# Patient Record
Sex: FEMALE | Race: WHITE | NOT HISPANIC OR LATINO | URBAN - METROPOLITAN AREA
[De-identification: names, ages, dates, MRNs, and addresses within clinical notes are randomized per-mention and may not be internally consistent; named-entity substitution may affect disease eponyms.]

---

## 2018-08-26 ENCOUNTER — EMERGENCY (EMERGENCY)
Facility: HOSPITAL | Age: 68
LOS: 1 days | Discharge: HOME | End: 2018-08-26
Admitting: EMERGENCY MEDICINE
Payer: MEDICARE

## 2018-08-26 VITALS
TEMPERATURE: 98 F | OXYGEN SATURATION: 98 % | WEIGHT: 104.94 LBS | DIASTOLIC BLOOD PRESSURE: 65 MMHG | HEART RATE: 75 BPM | RESPIRATION RATE: 18 BRPM | SYSTOLIC BLOOD PRESSURE: 128 MMHG

## 2018-08-26 DIAGNOSIS — S92.514A NONDISPLACED FRACTURE OF PROXIMAL PHALANX OF RIGHT LESSER TOE(S), INITIAL ENCOUNTER FOR CLOSED FRACTURE: ICD-10-CM

## 2018-08-26 DIAGNOSIS — Z88.0 ALLERGY STATUS TO PENICILLIN: ICD-10-CM
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
## 2018-08-26 DIAGNOSIS — M79.671 PAIN IN RIGHT FOOT: ICD-10-CM
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
## 2018-08-26 PROCEDURE — 99284 EMERGENCY DEPT VISIT MOD MDM: CPT

## 2018-08-26 PROCEDURE — 73660 X-RAY EXAM OF TOE(S): CPT | Mod: 26,RT

## 2018-08-26 RX ORDER — IBUPROFEN 200 MG
400 TABLET ORAL ONCE
Qty: 0 | Refills: 0 | Status: COMPLETED | OUTPATIENT
Start: 2018-08-26 | End: 2018-08-26

## 2018-08-26 RX ADMIN — Medication 400 MILLIGRAM(S): at 11:01

## 2018-08-26 NOTE — ED ADULT NURSE NOTE - NSIMPLEMENTINTERV_GEN_ALL_ED
Implemented All Universal Safety Interventions:  [place] to call system. Call bell, personal items and telephone within reach. Instruct patient to call for assistance. Room bathroom lighting operational. Non-slip footwear when patient is off stretcher. Physically safe environment: no spills, clutter or unnecessary equipment. Stretcher in lowest position, wheels locked, appropriate side rails in place.

## 2018-08-26 NOTE — ED PROVIDER NOTE - PHYSICAL EXAMINATION
R 4th digit: ecchymosis, edema, tenderness. no deformity. <2 sec capillary refill. sensation intact. minimal ROM    VITAL SIGNS: I have reviewed nursing notes and confirm.  CONSTITUTIONAL: Well-developed; well-nourished; in no acute distress.  SKIN: Skin is warm and dry, no acute rash.  HEAD: Normocephalic; atraumatic.  EYES: PERRL, EOM intact; conjunctiva and sclera clear.  ENT: No nasal discharge; airway clear.  NECK: Supple; non tender.  CARD: S1, S2 normal; no murmurs, gallops, or rubs. Regular rate and rhythm.  RESP: No wheezes, rales or rhonchi.  ABD: Normal bowel sounds; soft; non-distended; non-tender; no hepatosplenomegaly.  EXT: Normal ROM. No clubbing, cyanosis or edema.  NEURO: Alert, oriented. Grossly unremarkable.  PSYCH: Cooperative, appropriate.

## 2018-09-17 ENCOUNTER — OUTPATIENT (OUTPATIENT)
Dept: OUTPATIENT SERVICES | Facility: HOSPITAL | Age: 68
LOS: 1 days | End: 2018-09-17

## 2018-09-17 ENCOUNTER — APPOINTMENT (OUTPATIENT)
Dept: RADIOLOGY | Facility: CLINIC | Age: 68
End: 2018-09-17
Payer: MEDICARE

## 2018-09-17 PROBLEM — Z00.00 ENCOUNTER FOR PREVENTIVE HEALTH EXAMINATION: Status: ACTIVE | Noted: 2018-09-17

## 2018-09-17 PROCEDURE — 73660 X-RAY EXAM OF TOE(S): CPT | Mod: 26,RT

## 2018-09-20 ENCOUNTER — TRANSCRIPTION ENCOUNTER (OUTPATIENT)
Age: 68
End: 2018-09-20

## 2019-01-21 ENCOUNTER — EMERGENCY (EMERGENCY)
Facility: HOSPITAL | Age: 69
LOS: 1 days | Discharge: HOME | End: 2019-01-21
Admitting: EMERGENCY MEDICINE
Payer: MEDICARE

## 2019-01-21 VITALS
WEIGHT: 104.94 LBS | OXYGEN SATURATION: 98 % | RESPIRATION RATE: 16 BRPM | HEART RATE: 81 BPM | TEMPERATURE: 98 F | DIASTOLIC BLOOD PRESSURE: 61 MMHG | SYSTOLIC BLOOD PRESSURE: 134 MMHG

## 2019-01-21 PROCEDURE — 99283 EMERGENCY DEPT VISIT LOW MDM: CPT

## 2019-01-21 NOTE — ED PROVIDER NOTE - OBJECTIVE STATEMENT
67 y/o F presents to the ED with 3 weeks of on and off flu-like sx's. She endorses chills, nasal congestion, dry to productive cough and not feeling well. She states she was coughing quite a bit last night so she took OTC cough medicine which improved her cough, however she would like to make sure she is ok. She has a PMD but states she has not had time to call to make the appointment. No recent international travel.    Denies fever, headache, earache, epistaxis, sore throat, hemoptysis, CP, SOB, palpitations, abdo pain, N/V/D, rash, or recent international travel

## 2019-01-21 NOTE — ED PROVIDER NOTE - MEDICAL DECISION MAKING DETAILS
69 y/o F p/w 3 weeks of on and off flu-like sx's. She is A&Ox3, NAD and sitting comfortably. AFVSS. Will D/C with supportive tx instructions and to F/U with her PMD in one week. Strict return precautions reviewed with pt in which pt verbalizes understanding and agrees to.

## 2019-01-21 NOTE — ED PROVIDER NOTE - NSFOLLOWUPINSTRUCTIONS_ED_ALL_ED_FT
DRINK PLENTY OF FLUIDS TO STAY HYDRATED. TAKE TYLENOL AND/OR IBUPROFEN AS NEEDED.    FOLLOW UP WITH YOUR PRIMARY CARE PHYSICIAN IN ONE WEEK.    RETURN TO THE ER IF YOU DEVELOP PERSISTENT HIGH FEVER, CHEST PAIN, DIFFICULTY BREATHING, COUGHING UP BLOOD, OR ANY OTHER CONCERNING SIGNS OR SYMPTOMS.

## 2019-01-21 NOTE — ED ADULT NURSE NOTE - OBJECTIVE STATEMENT
pt is a 69 y/o female presents to the ED for URI sx x 3 weeks. denies fevers/chills. pt in NAD, resting comfortably and will continue to monitor.

## 2019-01-25 DIAGNOSIS — R68.83 CHILLS (WITHOUT FEVER): ICD-10-CM

## 2019-01-25 DIAGNOSIS — Z88.0 ALLERGY STATUS TO PENICILLIN: ICD-10-CM

## 2019-01-25 DIAGNOSIS — B34.9 VIRAL INFECTION, UNSPECIFIED: ICD-10-CM

## 2019-12-05 ENCOUNTER — TRANSCRIPTION ENCOUNTER (OUTPATIENT)
Age: 69
End: 2019-12-05

## 2019-12-17 ENCOUNTER — EMERGENCY (EMERGENCY)
Facility: HOSPITAL | Age: 69
LOS: 1 days | Discharge: HOME | End: 2019-12-17
Attending: EMERGENCY MEDICINE | Admitting: EMERGENCY MEDICINE
Payer: MEDICARE

## 2019-12-17 VITALS
OXYGEN SATURATION: 100 % | RESPIRATION RATE: 16 BRPM | DIASTOLIC BLOOD PRESSURE: 67 MMHG | TEMPERATURE: 98 F | HEART RATE: 64 BPM | SYSTOLIC BLOOD PRESSURE: 123 MMHG

## 2019-12-17 VITALS
HEART RATE: 72 BPM | OXYGEN SATURATION: 98 % | TEMPERATURE: 98 F | RESPIRATION RATE: 16 BRPM | DIASTOLIC BLOOD PRESSURE: 65 MMHG | SYSTOLIC BLOOD PRESSURE: 133 MMHG | WEIGHT: 104.94 LBS | HEIGHT: 65 IN

## 2019-12-17 LAB
ALBUMIN SERPL ELPH-MCNC: 4.2 G/DL — SIGNIFICANT CHANGE UP (ref 3.4–5)
ALP SERPL-CCNC: 52 U/L — SIGNIFICANT CHANGE UP (ref 40–120)
ALT FLD-CCNC: 29 U/L — SIGNIFICANT CHANGE UP (ref 12–42)
ANION GAP SERPL CALC-SCNC: 9 MMOL/L — SIGNIFICANT CHANGE UP (ref 9–16)
APPEARANCE UR: CLEAR — SIGNIFICANT CHANGE UP
AST SERPL-CCNC: 31 U/L — SIGNIFICANT CHANGE UP (ref 15–37)
BASOPHILS # BLD AUTO: 0.06 K/UL — SIGNIFICANT CHANGE UP (ref 0–0.2)
BASOPHILS NFR BLD AUTO: 0.7 % — SIGNIFICANT CHANGE UP (ref 0–2)
BILIRUB SERPL-MCNC: 0.4 MG/DL — SIGNIFICANT CHANGE UP (ref 0.2–1.2)
BILIRUB UR-MCNC: NEGATIVE — SIGNIFICANT CHANGE UP
BUN SERPL-MCNC: 28 MG/DL — HIGH (ref 7–23)
CALCIUM SERPL-MCNC: 10 MG/DL — SIGNIFICANT CHANGE UP (ref 8.5–10.5)
CHLORIDE SERPL-SCNC: 104 MMOL/L — SIGNIFICANT CHANGE UP (ref 96–108)
CO2 SERPL-SCNC: 27 MMOL/L — SIGNIFICANT CHANGE UP (ref 22–31)
COLOR SPEC: YELLOW — SIGNIFICANT CHANGE UP
CREAT SERPL-MCNC: 0.87 MG/DL — SIGNIFICANT CHANGE UP (ref 0.5–1.3)
DIFF PNL FLD: NEGATIVE — SIGNIFICANT CHANGE UP
EOSINOPHIL # BLD AUTO: 0.02 K/UL — SIGNIFICANT CHANGE UP (ref 0–0.5)
EOSINOPHIL NFR BLD AUTO: 0.2 % — SIGNIFICANT CHANGE UP (ref 0–6)
GLUCOSE SERPL-MCNC: 92 MG/DL — SIGNIFICANT CHANGE UP (ref 70–99)
GLUCOSE UR QL: NEGATIVE — SIGNIFICANT CHANGE UP
HCT VFR BLD CALC: 37.1 % — SIGNIFICANT CHANGE UP (ref 34.5–45)
HGB BLD-MCNC: 12.7 G/DL — SIGNIFICANT CHANGE UP (ref 11.5–15.5)
IMM GRANULOCYTES NFR BLD AUTO: 0.2 % — SIGNIFICANT CHANGE UP (ref 0–1.5)
KETONES UR-MCNC: NEGATIVE — SIGNIFICANT CHANGE UP
LEUKOCYTE ESTERASE UR-ACNC: NEGATIVE — SIGNIFICANT CHANGE UP
LYMPHOCYTES # BLD AUTO: 1.66 K/UL — SIGNIFICANT CHANGE UP (ref 1–3.3)
LYMPHOCYTES # BLD AUTO: 18.7 % — SIGNIFICANT CHANGE UP (ref 13–44)
MAGNESIUM SERPL-MCNC: 1.9 MG/DL — SIGNIFICANT CHANGE UP (ref 1.6–2.6)
MCHC RBC-ENTMCNC: 32.8 PG — SIGNIFICANT CHANGE UP (ref 27–34)
MCHC RBC-ENTMCNC: 34.2 GM/DL — SIGNIFICANT CHANGE UP (ref 32–36)
MCV RBC AUTO: 95.9 FL — SIGNIFICANT CHANGE UP (ref 80–100)
MONOCYTES # BLD AUTO: 0.61 K/UL — SIGNIFICANT CHANGE UP (ref 0–0.9)
MONOCYTES NFR BLD AUTO: 6.9 % — SIGNIFICANT CHANGE UP (ref 2–14)
NEUTROPHILS # BLD AUTO: 6.52 K/UL — SIGNIFICANT CHANGE UP (ref 1.8–7.4)
NEUTROPHILS NFR BLD AUTO: 73.3 % — SIGNIFICANT CHANGE UP (ref 43–77)
NITRITE UR-MCNC: NEGATIVE — SIGNIFICANT CHANGE UP
NRBC # BLD: 0 /100 WBCS — SIGNIFICANT CHANGE UP (ref 0–0)
PH UR: 6 — SIGNIFICANT CHANGE UP (ref 5–8)
PLATELET # BLD AUTO: 327 K/UL — SIGNIFICANT CHANGE UP (ref 150–400)
POTASSIUM SERPL-MCNC: 4.7 MMOL/L — SIGNIFICANT CHANGE UP (ref 3.5–5.3)
POTASSIUM SERPL-SCNC: 4.7 MMOL/L — SIGNIFICANT CHANGE UP (ref 3.5–5.3)
PROT SERPL-MCNC: 7.8 G/DL — SIGNIFICANT CHANGE UP (ref 6.4–8.2)
PROT UR-MCNC: NEGATIVE MG/DL — SIGNIFICANT CHANGE UP
RBC # BLD: 3.87 M/UL — SIGNIFICANT CHANGE UP (ref 3.8–5.2)
RBC # FLD: 12.5 % — SIGNIFICANT CHANGE UP (ref 10.3–14.5)
SODIUM SERPL-SCNC: 140 MMOL/L — SIGNIFICANT CHANGE UP (ref 132–145)
SP GR SPEC: <=1.005 — SIGNIFICANT CHANGE UP (ref 1–1.03)
TROPONIN I SERPL-MCNC: <0.017 NG/ML — LOW (ref 0.02–0.06)
TSH SERPL-MCNC: 2.17 UIU/ML — SIGNIFICANT CHANGE UP (ref 0.36–3.74)
UROBILINOGEN FLD QL: 0.2 E.U./DL — SIGNIFICANT CHANGE UP
WBC # BLD: 8.89 K/UL — SIGNIFICANT CHANGE UP (ref 3.8–10.5)
WBC # FLD AUTO: 8.89 K/UL — SIGNIFICANT CHANGE UP (ref 3.8–10.5)

## 2019-12-17 PROCEDURE — 99284 EMERGENCY DEPT VISIT MOD MDM: CPT

## 2019-12-17 PROCEDURE — 93010 ELECTROCARDIOGRAM REPORT: CPT

## 2019-12-17 PROCEDURE — 70450 CT HEAD/BRAIN W/O DYE: CPT | Mod: 26

## 2019-12-17 RX ORDER — MECLIZINE HCL 12.5 MG
1 TABLET ORAL
Qty: 20 | Refills: 0
Start: 2019-12-17 | End: 2019-12-21

## 2019-12-17 NOTE — ED PROVIDER NOTE - PROGRESS NOTE DETAILS
no dizziness now, normal labs, UA and CT head. Encouraged f/u w ent, trial of meclizine, RTER if any worsening symptoms

## 2019-12-17 NOTE — ED PROVIDER NOTE - PATIENT PORTAL LINK FT
You can access the FollowMyHealth Patient Portal offered by [facility] by registering at the following website: [email or web address]. By joining [employer]’s FollowMyHealth portal, you will also be able to view your health information using other applications (apps) compatible with our system.

## 2019-12-17 NOTE — ED PROVIDER NOTE - CARE PROVIDER_API CALL
Chai Aragon)  Otolaryngology  [address], [address]nd [place] [address]  Phone: [phone]  Fax: [phone]  Follow Up Time:

## 2019-12-17 NOTE — ED ADULT TRIAGE NOTE - CHIEF COMPLAINT QUOTE
reoccurring "imbalance" x 2 years, this episode started 2 weeks ago, denies dizziness, weakness, pain

## 2019-12-17 NOTE — ED PROVIDER NOTE - OBJECTIVE STATEMENT
68 yo female pt, 68 yo female pt, no hx of med problems. Hx of prior episode of dizziness (2 years ag0 - worked up by ENT [facility] and had MRIs, hearing test, self limited episode). Presents today with intermitent episodes of dizziness, feels off balance and times, briefly, then resolves for 1-2 weeks. States she has had some recent stressors, mom recently . Sister was sick with a laryngitis and she was exposed. today woke up with a sorethroat. maybe some mild URI symptoms recently. ROS: no cough, no chest pain, no palpitations, no HA, no neck pain, no ear pain, no abd pain, no weakness, no paresthesias, no aphasia.

## 2019-12-17 NOTE — ED PROVIDER NOTE - CLINICAL SUMMARY MEDICAL DECISION MAKING FREE TEXT BOX
Pt w dizziness, intermitent for 2 weeks, explained that since symptoms are insidious will do work up including labs and HCT, maybe reactivation of prior peripheral dizziness?

## 2019-12-22 DIAGNOSIS — R42 DIZZINESS AND GIDDINESS: ICD-10-CM

## 2022-01-28 ENCOUNTER — ANESTHESIA EVENT (INPATIENT)
Dept: OPERATING ROOM | Facility: HOSPITAL | Age: 72
DRG: 481 | End: 2022-01-28
Payer: MEDICARE

## 2022-01-28 ENCOUNTER — ANESTHESIA (INPATIENT)
Dept: OPERATING ROOM | Facility: HOSPITAL | Age: 72
DRG: 481 | End: 2022-01-28
Payer: MEDICARE

## 2022-01-28 ENCOUNTER — APPOINTMENT (EMERGENCY)
Dept: RADIOLOGY | Facility: HOSPITAL | Age: 72
DRG: 481 | End: 2022-01-28
Attending: EMERGENCY MEDICINE
Payer: MEDICARE

## 2022-01-28 ENCOUNTER — HOSPITAL ENCOUNTER (INPATIENT)
Facility: HOSPITAL | Age: 72
LOS: 3 days | Discharge: HOME | DRG: 481 | End: 2022-01-31
Attending: EMERGENCY MEDICINE | Admitting: ORTHOPAEDIC SURGERY
Payer: MEDICARE

## 2022-01-28 ENCOUNTER — APPOINTMENT (EMERGENCY)
Dept: RADIOLOGY | Facility: HOSPITAL | Age: 72
DRG: 481 | End: 2022-01-28
Attending: STUDENT IN AN ORGANIZED HEALTH CARE EDUCATION/TRAINING PROGRAM
Payer: MEDICARE

## 2022-01-28 ENCOUNTER — APPOINTMENT (INPATIENT)
Dept: RADIOLOGY | Facility: HOSPITAL | Age: 72
DRG: 481 | End: 2022-01-28
Attending: ORTHOPAEDIC SURGERY
Payer: MEDICARE

## 2022-01-28 DIAGNOSIS — N83.201 BILATERAL OVARIAN CYSTS: ICD-10-CM

## 2022-01-28 DIAGNOSIS — N83.202 BILATERAL OVARIAN CYSTS: ICD-10-CM

## 2022-01-28 DIAGNOSIS — S72.002A CLOSED LEFT HIP FRACTURE, INITIAL ENCOUNTER (CMS/HCC): Primary | ICD-10-CM

## 2022-01-28 PROBLEM — L70.8 OTHER ACNE: Status: ACTIVE | Noted: 2022-01-28

## 2022-01-28 PROBLEM — Z01.818 ENCOUNTER FOR PRE-OPERATIVE EXAMINATION: Status: ACTIVE | Noted: 2022-01-28

## 2022-01-28 PROBLEM — R09.89 BILATERAL CAROTID BRUITS: Status: ACTIVE | Noted: 2022-01-28

## 2022-01-28 PROBLEM — D72.829 LEUCOCYTOSIS: Status: ACTIVE | Noted: 2022-01-28

## 2022-01-28 LAB
ABO + RH BLD: NORMAL
ANION GAP SERPL CALC-SCNC: 13 MEQ/L (ref 3–15)
APTT PPP: 24 SEC (ref 23–35)
BASOPHILS # BLD: 0.04 K/UL (ref 0.01–0.1)
BASOPHILS NFR BLD: 0.3 %
BLD GP AB SCN SERPL QL: NEGATIVE
BUN SERPL-MCNC: 23 MG/DL (ref 8–20)
CALCIUM SERPL-MCNC: 10.1 MG/DL (ref 8.9–10.3)
CHLORIDE SERPL-SCNC: 99 MEQ/L (ref 98–109)
CO2 SERPL-SCNC: 24 MEQ/L (ref 22–32)
CREAT SERPL-MCNC: 0.9 MG/DL (ref 0.6–1.1)
D AG BLD QL: POSITIVE
DIFFERENTIAL METHOD BLD: ABNORMAL
EOSINOPHIL # BLD: 0 K/UL (ref 0.04–0.36)
EOSINOPHIL NFR BLD: 0 %
ERYTHROCYTE [DISTWIDTH] IN BLOOD BY AUTOMATED COUNT: 11.9 % (ref 11.7–14.4)
GFR SERPL CREATININE-BSD FRML MDRD: >60 ML/MIN/1.73M*2
GLUCOSE SERPL-MCNC: 95 MG/DL (ref 70–99)
HCT VFR BLDCO AUTO: 37.8 % (ref 35–45)
HGB BLD-MCNC: 12.7 G/DL (ref 11.8–15.7)
IMM GRANULOCYTES # BLD AUTO: 0.06 K/UL (ref 0–0.08)
IMM GRANULOCYTES NFR BLD AUTO: 0.5 %
INR PPP: 1.1
LABORATORY COMMENT REPORT: NORMAL
LYMPHOCYTES # BLD: 0.75 K/UL (ref 1.2–3.5)
LYMPHOCYTES NFR BLD: 6 %
MCH RBC QN AUTO: 33.2 PG (ref 28–33.2)
MCHC RBC AUTO-ENTMCNC: 33.6 G/DL (ref 32.2–35.5)
MCV RBC AUTO: 99 FL (ref 83–98)
MONOCYTES # BLD: 0.6 K/UL (ref 0.28–0.8)
MONOCYTES NFR BLD: 4.8 %
NEUTROPHILS # BLD: 11.07 K/UL (ref 1.7–7)
NEUTS SEG NFR BLD: 88.4 %
NRBC BLD-RTO: 0 %
PDW BLD AUTO: 9.7 FL (ref 9.4–12.3)
PLATELET # BLD AUTO: 302 K/UL (ref 150–369)
POTASSIUM SERPL-SCNC: 4.2 MEQ/L (ref 3.6–5.1)
PROTHROMBIN TIME: 14.2 SEC (ref 12.2–14.5)
RBC # BLD AUTO: 3.82 M/UL (ref 3.93–5.22)
SARS-COV-2 RNA RESP QL NAA+PROBE: NEGATIVE
SODIUM SERPL-SCNC: 136 MEQ/L (ref 136–144)
SPECIMEN EXP DATE BLD: NORMAL
WBC # BLD AUTO: 12.52 K/UL (ref 3.8–10.5)

## 2022-01-28 PROCEDURE — 36000004 HC OR LEVEL 4 INITIAL 30MIN: Performed by: ORTHOPAEDIC SURGERY

## 2022-01-28 PROCEDURE — 36415 COLL VENOUS BLD VENIPUNCTURE: CPT | Performed by: PHYSICIAN ASSISTANT

## 2022-01-28 PROCEDURE — 36000014 HC OR LEVEL 4 EA ADDL MIN: Performed by: ORTHOPAEDIC SURGERY

## 2022-01-28 PROCEDURE — 71000001 HC PACU PHASE 1 INITIAL 30MIN: Performed by: ORTHOPAEDIC SURGERY

## 2022-01-28 PROCEDURE — 63600000 HC DRUGS/DETAIL CODE: Performed by: ANESTHESIOLOGY

## 2022-01-28 PROCEDURE — U0002 COVID-19 LAB TEST NON-CDC: HCPCS | Performed by: PHYSICIAN ASSISTANT

## 2022-01-28 PROCEDURE — 99233 SBSQ HOSP IP/OBS HIGH 50: CPT | Performed by: HOSPITALIST

## 2022-01-28 PROCEDURE — 0QS736Z REPOSITION LEFT UPPER FEMUR WITH INTRAMEDULLARY INTERNAL FIXATION DEVICE, PERCUTANEOUS APPROACH: ICD-10-PCS | Performed by: ORTHOPAEDIC SURGERY

## 2022-01-28 PROCEDURE — 63600000 HC DRUGS/DETAIL CODE: Performed by: STUDENT IN AN ORGANIZED HEALTH CARE EDUCATION/TRAINING PROGRAM

## 2022-01-28 PROCEDURE — G1004 CDSM NDSC: HCPCS

## 2022-01-28 PROCEDURE — 63600000 HC DRUGS/DETAIL CODE: Performed by: NURSE ANESTHETIST, CERTIFIED REGISTERED

## 2022-01-28 PROCEDURE — 25800000 HC PHARMACY IV SOLUTIONS: Performed by: NURSE ANESTHETIST, CERTIFIED REGISTERED

## 2022-01-28 PROCEDURE — 85610 PROTHROMBIN TIME: CPT | Performed by: PHYSICIAN ASSISTANT

## 2022-01-28 PROCEDURE — 71045 X-RAY EXAM CHEST 1 VIEW: CPT

## 2022-01-28 PROCEDURE — 86900 BLOOD TYPING SEROLOGIC ABO: CPT

## 2022-01-28 PROCEDURE — 63700000 HC SELF-ADMINISTRABLE DRUG: Performed by: STUDENT IN AN ORGANIZED HEALTH CARE EDUCATION/TRAINING PROGRAM

## 2022-01-28 PROCEDURE — 80048 BASIC METABOLIC PNL TOTAL CA: CPT | Performed by: PHYSICIAN ASSISTANT

## 2022-01-28 PROCEDURE — 37000001 HC ANESTHESIA GENERAL: Performed by: ORTHOPAEDIC SURGERY

## 2022-01-28 PROCEDURE — 71000011 HC PACU PHASE 1 EA ADDL MIN: Performed by: ORTHOPAEDIC SURGERY

## 2022-01-28 PROCEDURE — 85025 COMPLETE CBC W/AUTO DIFF WBC: CPT | Performed by: PHYSICIAN ASSISTANT

## 2022-01-28 PROCEDURE — 73502 X-RAY EXAM HIP UNI 2-3 VIEWS: CPT | Mod: LT

## 2022-01-28 PROCEDURE — 25000000 HC PHARMACY GENERAL: Performed by: NURSE ANESTHETIST, CERTIFIED REGISTERED

## 2022-01-28 PROCEDURE — 93005 ELECTROCARDIOGRAM TRACING: CPT | Performed by: PHYSICIAN ASSISTANT

## 2022-01-28 PROCEDURE — 36100330 FL FLUOROSCOPY TECHNICAL ASSISTANCE

## 2022-01-28 PROCEDURE — 12000000 HC ROOM AND CARE MED/SURG

## 2022-01-28 PROCEDURE — 85730 THROMBOPLASTIN TIME PARTIAL: CPT | Performed by: PHYSICIAN ASSISTANT

## 2022-01-28 PROCEDURE — 99285 EMERGENCY DEPT VISIT HI MDM: CPT | Mod: 25

## 2022-01-28 PROCEDURE — 63600000 HC DRUGS/DETAIL CODE: Performed by: PHYSICIAN ASSISTANT

## 2022-01-28 PROCEDURE — C1713 ANCHOR/SCREW BN/BN,TIS/BN: HCPCS | Performed by: ORTHOPAEDIC SURGERY

## 2022-01-28 PROCEDURE — 27200000 HC STERILE SUPPLY: Performed by: ORTHOPAEDIC SURGERY

## 2022-01-28 DEVICE — SCREW TFNA 80MM - STERILE: Type: IMPLANTABLE DEVICE | Site: HIP | Status: FUNCTIONAL

## 2022-01-28 DEVICE — NAIL TFNA 10MM/130 DEG TI CANN 170MM - STERILE: Type: IMPLANTABLE DEVICE | Site: HIP | Status: FUNCTIONAL

## 2022-01-28 DEVICE — SCREW 5.0MM TI LOCKING  W/T25 STARDRIVE 30MM F/IM NAIL STER: Type: IMPLANTABLE DEVICE | Site: HIP | Status: FUNCTIONAL

## 2022-01-28 RX ORDER — IBUPROFEN 200 MG
16-32 TABLET ORAL AS NEEDED
Status: DISCONTINUED | OUTPATIENT
Start: 2022-01-28 | End: 2022-01-31

## 2022-01-28 RX ORDER — IBUPROFEN 200 MG
16-32 TABLET ORAL AS NEEDED
Status: DISCONTINUED | OUTPATIENT
Start: 2022-01-28 | End: 2022-01-28

## 2022-01-28 RX ORDER — MORPHINE SULFATE 2 MG/ML
2 INJECTION, SOLUTION INTRAMUSCULAR; INTRAVENOUS ONCE
Status: COMPLETED | OUTPATIENT
Start: 2022-01-28 | End: 2022-01-28

## 2022-01-28 RX ORDER — POLYETHYLENE GLYCOL 3350 17 G/17G
17 POWDER, FOR SOLUTION ORAL DAILY
Status: DISCONTINUED | OUTPATIENT
Start: 2022-01-29 | End: 2022-01-31 | Stop reason: HOSPADM

## 2022-01-28 RX ORDER — DEXTROSE 50 % IN WATER (D50W) INTRAVENOUS SYRINGE
25 AS NEEDED
Status: DISCONTINUED | OUTPATIENT
Start: 2022-01-28 | End: 2022-01-31

## 2022-01-28 RX ORDER — DEXTROSE 40 %
15-30 GEL (GRAM) ORAL AS NEEDED
Status: DISCONTINUED | OUTPATIENT
Start: 2022-01-28 | End: 2022-01-31

## 2022-01-28 RX ORDER — OXYCODONE HYDROCHLORIDE 5 MG/1
5 TABLET ORAL ONCE
Status: COMPLETED | OUTPATIENT
Start: 2022-01-28 | End: 2022-01-28

## 2022-01-28 RX ORDER — DEXTROSE 50 % IN WATER (D50W) INTRAVENOUS SYRINGE
25 AS NEEDED
Status: DISCONTINUED | OUTPATIENT
Start: 2022-01-28 | End: 2022-01-28

## 2022-01-28 RX ORDER — SODIUM CHLORIDE 9 MG/ML
INJECTION, SOLUTION INTRAVENOUS CONTINUOUS PRN
Status: DISCONTINUED | OUTPATIENT
Start: 2022-01-28 | End: 2022-01-28 | Stop reason: SURG

## 2022-01-28 RX ORDER — DEXTROSE 40 %
15-30 GEL (GRAM) ORAL AS NEEDED
Status: DISCONTINUED | OUTPATIENT
Start: 2022-01-28 | End: 2022-01-28

## 2022-01-28 RX ORDER — FENTANYL CITRATE 50 UG/ML
INJECTION, SOLUTION INTRAMUSCULAR; INTRAVENOUS AS NEEDED
Status: DISCONTINUED | OUTPATIENT
Start: 2022-01-28 | End: 2022-01-28 | Stop reason: SURG

## 2022-01-28 RX ORDER — DEXAMETHASONE SODIUM PHOSPHATE 4 MG/ML
INJECTION, SOLUTION INTRA-ARTICULAR; INTRALESIONAL; INTRAMUSCULAR; INTRAVENOUS; SOFT TISSUE AS NEEDED
Status: DISCONTINUED | OUTPATIENT
Start: 2022-01-28 | End: 2022-01-28 | Stop reason: SURG

## 2022-01-28 RX ORDER — NAPROXEN SODIUM 220 MG/1
81 TABLET, FILM COATED ORAL 2 TIMES DAILY
Status: DISCONTINUED | OUTPATIENT
Start: 2022-01-28 | End: 2022-01-31 | Stop reason: HOSPADM

## 2022-01-28 RX ORDER — ONDANSETRON HYDROCHLORIDE 2 MG/ML
INJECTION, SOLUTION INTRAVENOUS AS NEEDED
Status: DISCONTINUED | OUTPATIENT
Start: 2022-01-28 | End: 2022-01-28 | Stop reason: SURG

## 2022-01-28 RX ORDER — ROCURONIUM BROMIDE 10 MG/ML
INJECTION, SOLUTION INTRAVENOUS AS NEEDED
Status: DISCONTINUED | OUTPATIENT
Start: 2022-01-28 | End: 2022-01-28 | Stop reason: SURG

## 2022-01-28 RX ORDER — PROPOFOL 10 MG/ML
INJECTION, EMULSION INTRAVENOUS AS NEEDED
Status: DISCONTINUED | OUTPATIENT
Start: 2022-01-28 | End: 2022-01-28 | Stop reason: SURG

## 2022-01-28 RX ORDER — AMOXICILLIN 250 MG
1 CAPSULE ORAL 2 TIMES DAILY
Status: DISCONTINUED | OUTPATIENT
Start: 2022-01-28 | End: 2022-01-28

## 2022-01-28 RX ORDER — HYDROMORPHONE HYDROCHLORIDE 1 MG/ML
0.5 INJECTION, SOLUTION INTRAMUSCULAR; INTRAVENOUS; SUBCUTANEOUS
Status: DISCONTINUED | OUTPATIENT
Start: 2022-01-28 | End: 2022-01-28 | Stop reason: HOSPADM

## 2022-01-28 RX ORDER — IBUPROFEN 200 MG
16-32 TABLET ORAL AS NEEDED
Status: DISCONTINUED | OUTPATIENT
Start: 2022-01-28 | End: 2022-01-31 | Stop reason: HOSPADM

## 2022-01-28 RX ORDER — FENTANYL CITRATE 50 UG/ML
50 INJECTION, SOLUTION INTRAMUSCULAR; INTRAVENOUS
Status: DISCONTINUED | OUTPATIENT
Start: 2022-01-28 | End: 2022-01-28 | Stop reason: HOSPADM

## 2022-01-28 RX ORDER — HYDROMORPHONE HYDROCHLORIDE 1 MG/ML
0.5 INJECTION, SOLUTION INTRAMUSCULAR; INTRAVENOUS; SUBCUTANEOUS ONCE
Status: COMPLETED | OUTPATIENT
Start: 2022-01-28 | End: 2022-01-28

## 2022-01-28 RX ORDER — DEXTROSE 50 % IN WATER (D50W) INTRAVENOUS SYRINGE
25 AS NEEDED
Status: DISCONTINUED | OUTPATIENT
Start: 2022-01-28 | End: 2022-01-31 | Stop reason: HOSPADM

## 2022-01-28 RX ORDER — AMOXICILLIN 250 MG
1 CAPSULE ORAL 2 TIMES DAILY
Status: DISCONTINUED | OUTPATIENT
Start: 2022-01-28 | End: 2022-01-31 | Stop reason: HOSPADM

## 2022-01-28 RX ORDER — ONDANSETRON HYDROCHLORIDE 2 MG/ML
4 INJECTION, SOLUTION INTRAVENOUS
Status: DISCONTINUED | OUTPATIENT
Start: 2022-01-28 | End: 2022-01-28 | Stop reason: HOSPADM

## 2022-01-28 RX ORDER — SPIRONOLACTONE 100 MG/1
100 TABLET, FILM COATED ORAL DAILY
COMMUNITY
Start: 2021-12-21

## 2022-01-28 RX ORDER — ACETAMINOPHEN 325 MG/1
650 TABLET ORAL EVERY 4 HOURS
Status: DISCONTINUED | OUTPATIENT
Start: 2022-01-28 | End: 2022-01-31 | Stop reason: HOSPADM

## 2022-01-28 RX ORDER — MIDAZOLAM HYDROCHLORIDE 2 MG/2ML
INJECTION, SOLUTION INTRAMUSCULAR; INTRAVENOUS AS NEEDED
Status: DISCONTINUED | OUTPATIENT
Start: 2022-01-28 | End: 2022-01-28 | Stop reason: SURG

## 2022-01-28 RX ORDER — LIDOCAINE HYDROCHLORIDE 10 MG/ML
INJECTION, SOLUTION INFILTRATION; PERINEURAL AS NEEDED
Status: DISCONTINUED | OUTPATIENT
Start: 2022-01-28 | End: 2022-01-28 | Stop reason: SURG

## 2022-01-28 RX ORDER — OXYCODONE HYDROCHLORIDE 5 MG/1
5-10 TABLET ORAL EVERY 4 HOURS PRN
Status: DISCONTINUED | OUTPATIENT
Start: 2022-01-28 | End: 2022-01-31 | Stop reason: HOSPADM

## 2022-01-28 RX ORDER — PHENYLEPHRINE HYDROCHLORIDE 10 MG/ML
INJECTION INTRAVENOUS AS NEEDED
Status: DISCONTINUED | OUTPATIENT
Start: 2022-01-28 | End: 2022-01-28 | Stop reason: SURG

## 2022-01-28 RX ORDER — CEFAZOLIN SODIUM 2 G/100ML
INJECTION, SOLUTION INTRAVENOUS AS NEEDED
Status: DISCONTINUED | OUTPATIENT
Start: 2022-01-28 | End: 2022-01-28 | Stop reason: SURG

## 2022-01-28 RX ORDER — DEXTROSE 40 %
15-30 GEL (GRAM) ORAL AS NEEDED
Status: DISCONTINUED | OUTPATIENT
Start: 2022-01-28 | End: 2022-01-31 | Stop reason: HOSPADM

## 2022-01-28 RX ORDER — ACETAMINOPHEN 500 MG
5000 TABLET ORAL DAILY
COMMUNITY

## 2022-01-28 RX ORDER — ALUMINUM HYDROXIDE, MAGNESIUM HYDROXIDE, AND SIMETHICONE 1200; 120; 1200 MG/30ML; MG/30ML; MG/30ML
30 SUSPENSION ORAL EVERY 4 HOURS PRN
Status: DISCONTINUED | OUTPATIENT
Start: 2022-01-28 | End: 2022-01-31 | Stop reason: HOSPADM

## 2022-01-28 RX ORDER — KETOROLAC TROMETHAMINE 30 MG/ML
INJECTION, SOLUTION INTRAMUSCULAR; INTRAVENOUS AS NEEDED
Status: DISCONTINUED | OUTPATIENT
Start: 2022-01-28 | End: 2022-01-28 | Stop reason: SURG

## 2022-01-28 RX ADMIN — MORPHINE SULFATE 2 MG: 2 INJECTION, SOLUTION INTRAMUSCULAR; INTRAVENOUS at 11:24

## 2022-01-28 RX ADMIN — KETOROLAC TROMETHAMINE 15 MG: 30 INJECTION, SOLUTION INTRAMUSCULAR at 19:15

## 2022-01-28 RX ADMIN — TRANEXAMIC ACID 1000 MG: 100 INJECTION, SOLUTION INTRAVENOUS at 18:42

## 2022-01-28 RX ADMIN — MIDAZOLAM HYDROCHLORIDE 2 MG: 1 INJECTION, SOLUTION INTRAMUSCULAR; INTRAVENOUS at 18:07

## 2022-01-28 RX ADMIN — CEFAZOLIN SODIUM 2 G: 2 INJECTION, SOLUTION INTRAVENOUS at 18:35

## 2022-01-28 RX ADMIN — PROPOFOL 200 MG: 10 INJECTION, EMULSION INTRAVENOUS at 18:11

## 2022-01-28 RX ADMIN — SENNOSIDES AND DOCUSATE SODIUM 1 TABLET: 50; 8.6 TABLET ORAL at 22:14

## 2022-01-28 RX ADMIN — FENTANYL CITRATE 50 MCG: 50 INJECTION, SOLUTION INTRAMUSCULAR; INTRAVENOUS at 18:09

## 2022-01-28 RX ADMIN — VANCOMYCIN HYDROCHLORIDE 750 MG: 100 INJECTION, POWDER, LYOPHILIZED, FOR SOLUTION INTRAVENOUS at 18:06

## 2022-01-28 RX ADMIN — LIDOCAINE HYDROCHLORIDE 5 ML: 10 INJECTION, SOLUTION INFILTRATION; PERINEURAL at 18:11

## 2022-01-28 RX ADMIN — SODIUM CHLORIDE: 9 INJECTION, SOLUTION INTRAVENOUS at 18:07

## 2022-01-28 RX ADMIN — ASPIRIN 81 MG CHEWABLE TABLET 81 MG: 81 TABLET CHEWABLE at 22:14

## 2022-01-28 RX ADMIN — PHENYLEPHRINE HYDROCHLORIDE 200 MCG: 10 INJECTION INTRAVENOUS at 18:16

## 2022-01-28 RX ADMIN — ROCURONIUM BROMIDE 50 MG: 10 INJECTION, SOLUTION INTRAVENOUS at 18:11

## 2022-01-28 RX ADMIN — FENTANYL CITRATE 50 MCG: 50 INJECTION, SOLUTION INTRAMUSCULAR; INTRAVENOUS at 18:11

## 2022-01-28 RX ADMIN — MORPHINE SULFATE 2 MG: 2 INJECTION, SOLUTION INTRAMUSCULAR; INTRAVENOUS at 14:37

## 2022-01-28 RX ADMIN — ONDANSETRON 4 MG: 2 INJECTION INTRAMUSCULAR; INTRAVENOUS at 18:27

## 2022-01-28 RX ADMIN — DEXAMETHASONE SODIUM PHOSPHATE 4 MG: 4 INJECTION, SOLUTION INTRA-ARTICULAR; INTRALESIONAL; INTRAMUSCULAR; INTRAVENOUS; SOFT TISSUE at 18:27

## 2022-01-28 RX ADMIN — SUGAMMADEX 200 MG: 100 INJECTION, SOLUTION INTRAVENOUS at 19:22

## 2022-01-28 RX ADMIN — HYDROMORPHONE HYDROCHLORIDE 0.5 MG: 1 INJECTION, SOLUTION INTRAMUSCULAR; INTRAVENOUS; SUBCUTANEOUS at 19:37

## 2022-01-28 RX ADMIN — FENTANYL CITRATE 50 MCG: 50 INJECTION, SOLUTION INTRAMUSCULAR; INTRAVENOUS at 19:58

## 2022-01-28 RX ADMIN — ACETAMINOPHEN 650 MG: 325 TABLET, FILM COATED ORAL at 22:14

## 2022-01-28 ASSESSMENT — ENCOUNTER SYMPTOMS
EXTREMITY NUMBNESS: 0
DIZZINESS: 0
NUMBNESS: 0
BACK PAIN: 0
HEADACHES: 0
ABDOMINAL PAIN: 0
LIGHT-HEADEDNESS: 0
FEVER: 0

## 2022-01-28 ASSESSMENT — PAIN SCALES - GENERAL: PAIN_LEVEL: 2

## 2022-01-28 ASSESSMENT — PATIENT HEALTH QUESTIONNAIRE - PHQ9: SUM OF ALL RESPONSES TO PHQ9 QUESTIONS 1 & 2: 0

## 2022-01-28 NOTE — CONSULTS
Consult Note    Subjective     Julisa Tyler is a 71 y.o. female who was admitted for Closed left hip fracture, initial encounter (CMS/[facility]) [S72.002A]. Patient was referred by Dr Courtney for patient co-management.     Patient is 71 y.o. very healthy, active and independent female with PMHx of osteopenia/osteopororis presenting after fall. She is in town visiting family from [place], and experienced a fall this morning - landing on her left hip. She has subsequently been unable to bear weight due to pain about the left hip    She denies any exertional chest pain or sob and her exercise tolerance is > 4 mets  She denies any history of cad/mi/stroke  ekg shows normal sinus rhythm with small q waves in septal leads  Mri pelvis shows left greater trochanteric fracture with intertrochanteric extension      Pertinent radiology results reviewed.    Medical History:   Past Medical History:   Diagnosis Date   • Acne        Surgical History:   Past Surgical History:   Procedure Laterality Date   • KNEE ARTHROSCOPY, MEDIAL PATELLO FEMORAL LIGAMENT RECONSTRUCTION W/ HAMSTRING GRAFT         Allergies: Penicillins    Current Inpatient Medications   Medication Dose Route Frequency Provider Last Rate Last Admin   • alum-mag hydroxide-simeth (MAALOX) 200-200-20 mg/5 mL suspension 30 mL  30 mL oral q4h PRN Scott Trujillo Jr., MD       • glucose chewable tablet 16-32 g of dextrose  16-32 g of dextrose oral PRN Scott Trujillo Jr., MD        Or   • dextrose 40 % oral gel 15-30 g of dextrose  15-30 g of dextrose oral PRN Scott Trujillo Jr., MD        Or   • glucagon (GLUCAGEN) injection 1 mg  1 mg intramuscular PRN Scott Trujillo Jr., MD        Or   • dextrose in water injection 12.5 g  25 mL intravenous PRN Scott Trujillo Jr., MD       • sennosides-docusate sodium (SENOKOT-S) 8.6-50 mg per tablet 1 tablet  1 tablet oral BID Scott Trujillo Jr., MD            Social History:   Social History     Socioeconomic History   •  Marital status: Single     Spouse name: None   • Number of children: None   • Years of education: None   • Highest education level: None   Occupational History   • None   Tobacco Use   • Smoking status: Never Smoker   • Smokeless tobacco: Never Used   Substance and Sexual Activity   • Alcohol use: Never   • Drug use: Never   • Sexual activity: Never   Other Topics Concern   • None   Social History Narrative   • None     Social Determinants of Health     Financial Resource Strain: Not on file   Food Insecurity: No Food Insecurity   • Worried About Running Out of Food in the Last Year: Never true   • Ran Out of Food in the Last Year: Never true   Transportation Needs: Not on file   Physical Activity: Not on file   Stress: Not on file   Social Connections: Not on file   Intimate Partner Violence: Not on file   Housing Stability: Not on file       Family History: History reviewed. No pertinent family history.    Review of Systems  All other systems reviewed and negative except as noted in the HPI.    Vital signs in last 24 hours:  Temp:  [36.8 °C (98.3 °F)] 36.8 °C (98.3 °F)  Heart Rate:  [77-80] 77  Resp:  [18] 18  BP: (122-144)/(62-75) 122/75    Objective     Physical Exam  General appearance: alert, appears stated age, cooperative, non-toxic  Head: normocephalic, without obvious abnormality, atraumatic  Eyes: conjunctivae clear. PERRL, EOMI's intact.  Lungs: clear to auscultation bilaterally   Heart: regular rate and rhythm, S1, S2 normal,   Abdomen: Nondistended, +BS, soft, non-tender, no masses palpable   Extremities: left lower ext is shortened and ext rotated  Pulses: 2+ and symmetric B/L DP  Neurologic: Alert and oriented X 3, no focal deficits  Skin: intact, no rashes or lesions  Bilateral carotid bruit noted          Labs  Lab Results   Component Value Date    WBC 12.52 (H) 01/28/2022    HGB 12.7 01/28/2022    HCT 37.8 01/28/2022     01/28/2022     01/28/2022    K 4.2 01/28/2022    CL 99  01/28/2022    CREATININE 0.9 01/28/2022    BUN 23 (H) 01/28/2022    CO2 24 01/28/2022    INR 1.1 01/28/2022       Imaging  I have independently reviewed the pertinent imaging from the last 24 hrs.    ECG/Telemetry  I have independently reviewed the ECG. Significant findings include normal sinus rhythm with small q waves in septal leads.    Assessment   71 y.o. female being consulted for patient co-management       Plan     * Closed left hip fracture, initial encounter (CMS/[facility])  Assessment & Plan  71 yr old female with no significant past medical history presenting with mechanical fall leading to left hip fracture  X rays- Acute fracture at the left greater trochanter. In addition, best seen   on the large field-of-view T1 series there is a small fracture line extending   into the intertrochanteric region with associated edema.    Npo for now  Pain control as per ortho  Start dvt prophylaxis postop    Encounter for pre-operative examination  Assessment & Plan  Patient is very active with good exercise tolerance of > 4 mets  She rides [place] bike every day for 30 mts  She denies any exertional chest pain or sob and her exercise tolerance is > 4 mets  She denies any history of cad/mi/stroke  She denies any bowel or bladder disturbance  She denies any history of dvt/pe  She denies any snoring  ekg shows normal sinus rhythm with small q waves in septal leads- no old ekg to compare.   She is at acceptable risk for surgery  She does have bilateral carotid bruit and I would recommend carotid ultrasound as outpatient    Other acne  Assessment & Plan  Hold aldactone for the moment    Leucocytosis  Assessment & Plan  wcc is 12.52- probably reactive- denies any urinary symptoms, denies any cough  Check ua/ c/s  cxr pending    Bilateral ovarian cysts  Assessment & Plan  Recommend outpatient follow up with gyn    Bilateral carotid bruits  Assessment & Plan  Patient denies any history of stroke or tia  I recommend outpatient  carotid ultrasound

## 2022-01-28 NOTE — CONSULTS
Orthopaedic Surgery Consult    CC: Left hip fracture    SUBJECTIVE   HPI: Julisa Tyler is a 71 y.o. very healthy, active and independent female with PMHx of osteopenia/osteopororis presenting after fall. She is in town visiting family from [place], and experienced a fall this morning - landing on her left hip. She has subsequently been unable to bear weight due to pain about the left hip. No distal numbness/paresthesias. No other reported injuries. Describes pain as a muscle aching/tightness.      Anticoagulation: None  Baseline ambulatory status: Unassisted ambulator    PMH:  Past Medical History:   Diagnosis Date   • Acne        PSH:  Past Surgical History:   Procedure Laterality Date   • KNEE ARTHROSCOPY, MEDIAL PATELLO FEMORAL LIGAMENT RECONSTRUCTION W/ HAMSTRING GRAFT         Meds:  No current facility-administered medications on file prior to encounter.     Current Outpatient Medications on File Prior to Encounter   Medication Sig Dispense Refill   • CALCIUM ORAL Take 1 tablet by mouth See admin instr.     • multivitamin liquid Take 1 tablet by mouth See admin instr.     • spironolactone (ALDACTONE) 100 mg tablet Take 100 mg by mouth daily.         Allergies:   Allergies   Allergen Reactions   • Penicillins Rash       SH:   EtOH: None  Smoking status: None  Drugs: None    ROS:  CV: Denies CP or Palpitations.  Pulm: Denies SOB or Pain with inspiration      OBJECTIVE  Vitals:    01/28/22 0954   BP:    Pulse:    Resp:    Temp: 36.8 °C (98.3 °F)   SpO2:        CBC Results    No lab values to display.         Physical Exam:    General  No acute distress while at rest  AAOx3    RUE  Inspection: No gross deformity.   Neurovascular: Palpable Radial Pulse. Sensation to light touch in Median, Ulnar, and Radial Distributions  Motor: Fires anterior interosseus nerve, posterior interosseus nerve, Radial nerve, Ulnar nerve, Hand intrinsics   Palpation:  No pain to palpation  ROM: Full Painless range of  motion    LUE  Inspection: No gross deformity.  Neurovascular: Palpable Radial Pulse. Sensation to light touch in Median, Ulnar, and Radial Distributions  Motor: Fires anterior interosseus nerve, posterior interosseus nerve, Radial nerve, Ulnar nerve, Hand intrinsics   Palpation:  No pain to palpation  ROM: Full Painless range of motion    RLE:  Inspection: No gross deformity. Skin in tact.   Neurovascular: Palpable dorsal pedis Pulse. Sensation to light touch in Sural, Saphenous, Tibial, superficial peroneal nerve, and deep peroneal nerve Distibutions  Motor:  Fires iliopsoas, Quadriceps, Tibialis Anterior, extensor hallucis longus, gastrocnemius-soleus  Palpation:  No pain to palpation    LLE  Inspection: No gross deformity. Skin in tact.   Neurovascular: Palpable dorsal pedis Pulse. Sensation to light touch in Sural, Saphenous, Tibial, superficial peroneal nerve, and deep peroneal nerve Distibutions  Motor:  Fires Tibialis Anterior, extensor hallucis longus, gastrocnemius-soleus  Palpation:  Mild pain of the left hip with deep palpation  ROM: Deferred due to known injury      Imaging:  X-ray of the left hip/pelvis showing isolated greater trochanter fracture      ASSESSMENT & PLAN   71 y.o. female, healthy, active who presents after fall with left isolated greater trochanter fracture.     -- Recommend MRI for further assessment of intra-trochanteric extension. Further plan pending MRI as isolated greater trochanter will likely be managed nonoperatively vs. Operatively for intra-trochanteric extension  -- NWB LLE for now  -- Pain control      Scott Trujillo Jr, MD

## 2022-01-28 NOTE — H&P (VIEW-ONLY)
Consult Note    Subjective     Julisa Tyler is a 71 y.o. female who was admitted for Closed left hip fracture, initial encounter (CMS/[facility]) [S72.002A]. Patient was referred by Dr Courtney for patient co-management.     Patient is 71 y.o. very healthy, active and independent female with PMHx of osteopenia/osteopororis presenting after fall. She is in town visiting family from [place], and experienced a fall this morning - landing on her left hip. She has subsequently been unable to bear weight due to pain about the left hip    She denies any exertional chest pain or sob and her exercise tolerance is > 4 mets  She denies any history of cad/mi/stroke  ekg shows normal sinus rhythm with small q waves in septal leads  Mri pelvis shows left greater trochanteric fracture with intertrochanteric extension      Pertinent radiology results reviewed.    Medical History:   Past Medical History:   Diagnosis Date   • Acne        Surgical History:   Past Surgical History:   Procedure Laterality Date   • KNEE ARTHROSCOPY, MEDIAL PATELLO FEMORAL LIGAMENT RECONSTRUCTION W/ HAMSTRING GRAFT         Allergies: Penicillins    Current Inpatient Medications   Medication Dose Route Frequency Provider Last Rate Last Admin   • alum-mag hydroxide-simeth (MAALOX) 200-200-20 mg/5 mL suspension 30 mL  30 mL oral q4h PRN Scott Trujillo Jr., MD       • glucose chewable tablet 16-32 g of dextrose  16-32 g of dextrose oral PRN Scott Trujillo Jr., MD        Or   • dextrose 40 % oral gel 15-30 g of dextrose  15-30 g of dextrose oral PRN Scott Trujillo Jr., MD        Or   • glucagon (GLUCAGEN) injection 1 mg  1 mg intramuscular PRN Scott Trujillo Jr., MD        Or   • dextrose in water injection 12.5 g  25 mL intravenous PRN Scott Trujillo Jr., MD       • sennosides-docusate sodium (SENOKOT-S) 8.6-50 mg per tablet 1 tablet  1 tablet oral BID Scott Trujillo Jr., MD            Social History:   Social History     Socioeconomic History   •  Marital status: Single     Spouse name: None   • Number of children: None   • Years of education: None   • Highest education level: None   Occupational History   • None   Tobacco Use   • Smoking status: Never Smoker   • Smokeless tobacco: Never Used   Substance and Sexual Activity   • Alcohol use: Never   • Drug use: Never   • Sexual activity: Never   Other Topics Concern   • None   Social History Narrative   • None     Social Determinants of Health     Financial Resource Strain: Not on file   Food Insecurity: No Food Insecurity   • Worried About Running Out of Food in the Last Year: Never true   • Ran Out of Food in the Last Year: Never true   Transportation Needs: Not on file   Physical Activity: Not on file   Stress: Not on file   Social Connections: Not on file   Intimate Partner Violence: Not on file   Housing Stability: Not on file       Family History: History reviewed. No pertinent family history.    Review of Systems  All other systems reviewed and negative except as noted in the HPI.    Vital signs in last 24 hours:  Temp:  [36.8 °C (98.3 °F)] 36.8 °C (98.3 °F)  Heart Rate:  [77-80] 77  Resp:  [18] 18  BP: (122-144)/(62-75) 122/75    Objective     Physical Exam  General appearance: alert, appears stated age, cooperative, non-toxic  Head: normocephalic, without obvious abnormality, atraumatic  Eyes: conjunctivae clear. PERRL, EOMI's intact.  Lungs: clear to auscultation bilaterally   Heart: regular rate and rhythm, S1, S2 normal,   Abdomen: Nondistended, +BS, soft, non-tender, no masses palpable   Extremities: left lower ext is shortened and ext rotated  Pulses: 2+ and symmetric B/L DP  Neurologic: Alert and oriented X 3, no focal deficits  Skin: intact, no rashes or lesions  Bilateral carotid bruit noted          Labs  Lab Results   Component Value Date    WBC 12.52 (H) 01/28/2022    HGB 12.7 01/28/2022    HCT 37.8 01/28/2022     01/28/2022     01/28/2022    K 4.2 01/28/2022    CL 99  01/28/2022    CREATININE 0.9 01/28/2022    BUN 23 (H) 01/28/2022    CO2 24 01/28/2022    INR 1.1 01/28/2022       Imaging  I have independently reviewed the pertinent imaging from the last 24 hrs.    ECG/Telemetry  I have independently reviewed the ECG. Significant findings include normal sinus rhythm with small q waves in septal leads.    Assessment   71 y.o. female being consulted for patient co-management       Plan     * Closed left hip fracture, initial encounter (CMS/[facility])  Assessment & Plan  71 yr old female with no significant past medical history presenting with mechanical fall leading to left hip fracture  X rays- Acute fracture at the left greater trochanter. In addition, best seen   on the large field-of-view T1 series there is a small fracture line extending   into the intertrochanteric region with associated edema.    Npo for now  Pain control as per ortho  Start dvt prophylaxis postop    Encounter for pre-operative examination  Assessment & Plan  Patient is very active with good exercise tolerance of > 4 mets  She rides [place] bike every day for 30 mts  She denies any exertional chest pain or sob and her exercise tolerance is > 4 mets  She denies any history of cad/mi/stroke  She denies any bowel or bladder disturbance  She denies any history of dvt/pe  She denies any snoring  ekg shows normal sinus rhythm with small q waves in septal leads- no old ekg to compare.   She is at acceptable risk for surgery  She does have bilateral carotid bruit and I would recommend carotid ultrasound as outpatient    Other acne  Assessment & Plan  Hold aldactone for the moment    Leucocytosis  Assessment & Plan  wcc is 12.52- probably reactive- denies any urinary symptoms, denies any cough  Check ua/ c/s  cxr pending    Bilateral ovarian cysts  Assessment & Plan  Recommend outpatient follow up with gyn    Bilateral carotid bruits  Assessment & Plan  Patient denies any history of stroke or tia  I recommend outpatient  carotid ultrasound

## 2022-01-28 NOTE — ANESTHESIA PROCEDURE NOTES
Airway  Urgency: elective    Start Time: 1/28/2022 6:12 PM  Airway not difficult    General Information and Staff    Patient location during procedure: OR  Anesthesiologist: Rebecca Reina DO  Resident/CRNA: Sonja Goel CRNA  Performed: resident/CRNA     Indications and Patient Condition  Indications for airway management: anesthesia  Sedation level: general  Preoxygenated: yes  Patient position: sniffing  Mask difficulty assessment: 1 - vent by mask    Final Airway Details  Final airway type: endotracheal airway      Successful airway: ETT  Cuffed: yes   Successful intubation technique: direct laryngoscopy  Facilitating devices/methods: anterior pressure/BURP  Endotracheal tube insertion site: oral  Blade: Bernadine  Blade size: #3  ETT size (mm): 7.0  Cormack-Lehane Classification: grade I - full view of glottis  Placement verified by: chest auscultation   Measured from: lips  ETT to lips (cm): 20  Number of attempts at approach: 1  Atraumatic airway insertion

## 2022-01-28 NOTE — ED ATTESTATION NOTE
I reviewed and agree with physician assistant / nurse practitioner’s assessment and plan of care, except as noted below.    My personal examination, assessment, and plan of care of Julisa LANDIN Jayson is as follows:    Pt here for fall, no apparent injuries per PA examination except for noted L hip fx making her unable to bear weight, in MRI currently, pending final ortho recs.     Steven Deluna MD  01/28/22 3536

## 2022-01-28 NOTE — ASSESSMENT & PLAN NOTE
71 yr old female with no significant past medical history presenting with mechanical fall leading to left hip fracture  S/p Left Femur Cephalomedullary Nail on 1/28/22  Continue PT/OT-feels that she is doing well with physical therapy.  Encourage IS  Continue Bowel regimen  DVT prophylaxis and pain meds per ortho  I discussed the diagnosis of osteoporosis with the patient.  She has had DEXA scans in the past.  She had Fosamax many years ago.  She was recently just taking supplements and trying to do weightbearing exercise.  I encouraged her to follow-up with her physicians, she sees an endocrinologist.  She may need further pharmacologic treatment and can discuss the risks/benefits with her outpatient physician.  We discussed management of osteoporosis to help prevent future fragility fractures.

## 2022-01-28 NOTE — ANESTHESIOLOGIST PRE-PROCEDURE ATTESTATION
Pre-Procedure Patient Identification:  I am the Primary Anesthesiologist and have identified the patient on 01/28/22 at 5:05 PM.   I have confirmed the procedure(s) will be performed by the following surgeon/proceduralist Halima Coutrney MD.

## 2022-01-28 NOTE — ED PROVIDER NOTES
"Emergency Medicine Note  HPI   HISTORY OF PRESENT ILLNESS       History provided by:  Patient and EMS personnel  Lower Extremity Issue  Location:  Hip  Time since incident: happened this morning.  Injury: yes    Mechanism of injury comment:  Pt states had mechanical slip and fall this morning landing on L hip. Pt initially able to ambulate but then \"heard a snap\" and fell. Since has been unable to weight bear 2/2 L hip pain. No head injury, dizziness prior to fall or LOC  Hip location:  L hip  Pain details:     Severity:  Moderate    Onset quality:  Gradual    Timing:  Constant    Progression:  Worsening  Chronicity:  New  Ineffective treatments:  None tried  Associated symptoms: decreased ROM    Associated symptoms: no back pain, no fever, no numbness and no swelling          Patient History   PAST HISTORY     Reviewed from Nursing Triage:  Tobacco  Allergies  Meds  Problems  Med Hx  Surg Hx  Fam Hx  Soc   Hx      Past Medical History:   Diagnosis Date   • Acne    • Osteoporosis        Past Surgical History:   Procedure Laterality Date   • KNEE ARTHROSCOPY, MEDIAL PATELLO FEMORAL LIGAMENT RECONSTRUCTION W/ HAMSTRING GRAFT         History reviewed. No pertinent family history.    Social History     Tobacco Use   • Smoking status: Never Smoker   • Smokeless tobacco: Never Used   Vaping Use   • Vaping Use: Never used   Substance Use Topics   • Alcohol use: Never   • Drug use: Never         Review of Systems   REVIEW OF SYSTEMS     Review of Systems   Constitutional: Negative for fever.   Gastrointestinal: Negative for abdominal pain.   Musculoskeletal: Negative for back pain.   Neurological: Negative for dizziness, syncope, light-headedness, numbness and headaches.         VITALS     ED Vitals    Date/Time Temp Pulse Resp BP SpO2 [facility]   01/28/22 2110 37.4 °C (99.4 °F) 73 18 109/62 100 % [facility]   01/28/22 2100 -- 74 16 108/64 -- [facility]   01/28/22 2055 -- 78 36 -- -- [facility]   01/28/22 2050 -- 76 16 107/55 -- [facility]   01/28/22 " 2045 -- 70 14 105/51 -- [facility]   01/28/22 2040 -- 75 39 -- 100 % [facility]   01/28/22 2030 -- 77 16 124/70 -- [facility]   01/28/22 2025 -- 71 43 -- 100 % [facility]   01/28/22 2020 -- 73 12 131/68 93 % [facility]   01/28/22 2010 -- 73 16 131/59 100 % [facility]   01/28/22 2005 37.1 °C (98.7 °F) 79 32 -- -- [facility]   01/28/22 2000 -- 80 14 128/62 -- [facility]   01/28/22 1950 -- 81 15 122/70 -- [facility]   01/28/22 1940 -- 75 20 118/70 -- [facility]   01/28/22 1743 37.1 °C (98.7 °F) 102 20 -- 98 % [facility]   01/28/22 1715 37.1 °C (98.8 °F) 102 18 150/67 97 % SM   01/28/22 1445 -- -- -- -- 98 % LMM   01/28/22 1321 -- 77 18 122/75 100 % [facility]   01/28/22 0954 36.8 °C (98.3 °F) -- -- -- -- [facility]   01/28/22 0947 -- 80 18 144/62 97 % [facility]        Pulse Ox %: 97 % (01/28/22 0950)  Pulse Ox Interpretation: Normal (01/28/22 0950)           Physical Exam   PHYSICAL EXAM     Physical Exam  Vitals and nursing note reviewed.   Constitutional:       General: She is not in acute distress.     Appearance: Normal appearance.   HENT:      Head: Atraumatic.   Neck:      Comments: No midline tenderness or step off  Cardiovascular:      Rate and Rhythm: Normal rate.      Pulses: Normal pulses.   Pulmonary:      Effort: Pulmonary effort is normal.   Chest:      Chest wall: No tenderness.   Abdominal:      Palpations: Abdomen is soft.      Tenderness: There is no abdominal tenderness. There is no guarding or rebound.   Musculoskeletal:      Cervical back: Normal range of motion and neck supple.      Left hip: Bony tenderness (to lateral hip without swelling or laceration) present. No deformity. Decreased range of motion.      Left knee: No swelling or bony tenderness. Normal range of motion.   Skin:     General: Skin is warm and dry.   Neurological:      General: No focal deficit present.      Mental Status: She is alert and oriented to person, place, and time.           PROCEDURES     Procedures     DATA     Results     Procedure Component Value Units Date/Time    Type and screen [[id number]] Collected:  01/28/22 1117    Specimen: Blood, Venous Updated: 01/28/22 1229     Specimen Expiration 01/31/2022     Antibody Screen Negative     ABO A     Rh Factor Positive     History Check No type on file    SARS-CoV-2 (COVID-19), PCR Nasopharynx [[id number]]  (Normal) Collected: 01/28/22 1128    Specimen: Nasopharyngeal Swab from Nasopharynx Updated: 01/28/22 1207    Narrative:      The following orders were created for panel order SARS-CoV-2 (COVID-19), PCR Nasopharynx.  Procedure                               Abnormality         Status                     ---------                               -----------         ------                     SARS-CoV-2 (COVID-19), P...[[id number]]  Normal              Final result                 Please view results for these tests on the individual orders.    SARS-CoV-2 (COVID-19), PCR Nasopharynx [[id number]]  (Normal) Collected: 01/28/22 1128    Specimen: Nasopharyngeal Swab from Nasopharynx Updated: 01/28/22 1207     SARS-CoV-2 (COVID-19) Negative    Narrative:      Nursing instructions: Obtain nasopharyngeal swab ONLY.  Send swab in viral transport media. If RSV/FLU swab is also ordered, both Covid and RSV/FLU can be performed on single swab.    Basic metabolic panel [[id number]]  (Abnormal) Collected: 01/28/22 1117    Specimen: Blood, Venous Updated: 01/28/22 1200     Sodium 136 mEQ/L      Potassium 4.2 mEQ/L      Chloride 99 mEQ/L      CO2 24 mEQ/L      BUN 23 mg/dL      Creatinine 0.9 mg/dL      Glucose 95 mg/dL      Calcium 10.1 mg/dL      eGFR >60.0 mL/min/1.73m*2      Anion Gap 13 mEQ/L     APTT [[id number]]  (Normal) Collected: 01/28/22 1117    Specimen: Blood, Venous Updated: 01/28/22 1152     PTT 24 sec     Protime-INR [[id number]]  (Normal) Collected: 01/28/22 1117    Specimen: Blood, Venous Updated: 01/28/22 1152     PT 14.2 sec      INR 1.1     Comment: INR has no defined significance when PT is within Reference Range.       CBC and differential [[id number]]  (Abnormal) Collected:  01/28/22 1117    Specimen: Blood, Venous Updated: 01/28/22 1137     WBC 12.52 K/uL      RBC 3.82 M/uL      Hemoglobin 12.7 g/dL      Hematocrit 37.8 %      MCV 99.0 fL      MCH 33.2 pg      MCHC 33.6 g/dL      RDW 11.9 %      Platelets 302 K/uL      MPV 9.7 fL      Differential Type Auto     nRBC 0.0 %      Immature Granulocytes 0.5 %      Neutrophils 88.4 %      Lymphocytes 6.0 %      Monocytes 4.8 %      Eosinophils 0.0 %      Basophils 0.3 %      Immature Granulocytes, Absolute 0.06 K/uL      Neutrophils, Absolute 11.07 K/uL      Lymphocytes, Absolute 0.75 K/uL      Monocytes, Absolute 0.60 K/uL      Eosinophils, Absolute 0.00 K/uL      Basophils, Absolute 0.04 K/uL           Imaging Results          MRI HIP LEFT WITHOUT CONTRAST (Final result)  Result time 01/28/22 15:49:55    Final result                 Impression:    IMPRESSION:  1.  Pelvic ultrasound is suggested for bilateral right larger than left ovarian  cysts including a right ovarian cyst that may be thinly septated (versus 2  adjacent cysts) measuring 2.4 cm. This can be performed electively.  2.  The left greater trochanteric fracture does extend into the  intertrochanteric region as a nondisplaced intertrochanteric fracture.  3.  Hematoma/fluid collection about the left greater trochanter/trochanteric  bursae with grade 2 strain/partial tear of the left quadratus femoris and  associated tendon strain/partial tearing as discussed below.    Findings and recommendations in the first 2 impressions discussed with Lorin Ndiaye PA at 3:48 PM 1/28/2022  In accordance with [place],  the patient will receive a letter notifying them  to follow up with their physician.  COMMENT:    Comparison: January 28, 2022 x-ray.    TECHNIQUE: MRI of the left hip was performed using a dedicated noncontrast  protocol which also includes  imaging through the bony pelvis.    FINDINGS:    Left hip: Acute fracture at the left greater trochanter. In addition, best  seen  on the large field-of-view T1 series there is a small fracture line extending  into the intertrochanteric region with associated edema.  No left hip osteonecrosis. No additional fracture. No joint effusion. Small  fluid in the left hip joint slightly greater than physiologic. There is  compatible with a minimal joint effusion. The ligamentum teres appears slightly  degenerated but intact. No high-grade chondral defect. Limited assessment of the  labrum. Noncontrast technique and motion reveals no gross findings of concern.  There is a hematoma along the posterior left greater tuberosity.    Right hip: Survey imaging of the right hip reveals no fracture or osteonecrosis.  No joint effusion or significant degenerative arthropathy is evident.    Bony pelvis: Pubic symphysis and the sacroiliac joints appear within normal  limits. No bony pelvic fracture. No suspicious bone marrow replacing lesion.  Hematopoietic bone marrow is incidentally noted. There is a small Tarlov cysts  in the sacrum.    Tendons and musculature:  At the left greater trochanter, there is a strain/low-grade partial tear of the  gluteus medius without full-thickness avulsion. Moderate grade partial tear of  the left gluteus minimus without full-thickness avulsion. Fluid and hematoma in  the left trochanteric bursa related to recent injury.  There is a extensively edematous appearance of the left quadratus femoris which  is thickened and probably moderately partially torn from its femoral attachment.  Soft tissue contusion along the lateral left thigh proximally. There is probably  a small amount of partial tearing involving the proximal left gluteus treva  from its posterior proximal femoral attachment.  Mild right-sided gluteus medius and minimus tendinosis without tear.  Mild bilateral hamstring tendinosis without tear.  A mild degree of piriformis, obturator internus and gemellus strain is noted at  the left hip without full-thickness  avulsion.  Remaining tendons about the bony pelvis are intact.    No muscle atrophy. No additional significant strain.    Visceral pelvis: Limited survey of the visceral pelvis reveals a small uterine  fibroid at the fundus measuring about 0.8 cm. Additional tiny fibroids are also  suspected elsewhere. Multiple bilateral ovarian cysts are noted incompletely  assessed. Pelvic ultrasound follow-up would be recommended.  No gross additional findings of concern in the pelvis.             Narrative:    CLINICAL HISTORY: Fracture, hip    evaluate for intertrochanteric extension of  the left greater tuberosity fracture.                               X-RAY HIP WITH OR WITHOUT PELVIS 2-3 VW LEFT (Final result)  Result time 01/28/22 10:55:17    Final result                 Impression:    IMPRESSION: See above. Findings discussed with Steven Deluna at 10:50 AM on  January 28, 2022             Narrative:    CLINICAL HISTORY: Trauma    PRIOR STUDY:  None    TECHNIQUE:  AP image of the pelvis and AP and lateral image of the left hip    COMMENT:  Findings suggestive of a nondisplaced fracture of the greater  trochanter. No other fracture or dislocation noted.                                ECG 12 lead    (Results Pending)       Scoring tools                                 ED Course & MDM   MDM / ED COURSE / CLINICAL IMPRESSIONS / DISPO     [facility]    ED Course as of 01/29/22 0558   Fri Jan 28, 2022   1000 Pt presents today for evaluation of L hip pain after mechanical fall. Extremity NV intact. X-ray ordered  Pt declined need for analgesia at this time  [NH]   1033 Xray concerning for fracture. Paged orthopedic team [NH]   1056 D/w orthopedic resident. Recommends pre operative labs, COVID swab and MRI as x-ray only appears to be greater troch fracture [NH]   1311 Apparently there are issues with MRI machine. Rep attempting to fix now. Updated patient and family [NH]   1421 MRI machine now working. Pt to be called soon [NH]   5062  Spoke with radiologist. Fracture does extend inter-troch. Plan for admission for OR  Also d/w patient and son-in-law incidental findings of b/l ovarian cysts and need for non emergent outpatient GYN f/u. Pt and son-in-law verbally agree with plan [NH]      ED Course User Index  [NH] Lorin Ndiaye PA C         Clinical Impressions as of 01/29/22 0558   Closed left hip fracture, initial encounter (CMS/[facility])   Bilateral ovarian cysts     Admit / Observation         Lorin Ndiaye PA C  01/29/22 0558

## 2022-01-28 NOTE — OR SURGEON
Pre-Procedure patient identification:  I am the primary operating surgeon/proceduralist and I have identified the patient and confirmed laterality is left on 01/28/22 at 5:32 PM Halima Courtney MD  Phone Number: [phone]

## 2022-01-28 NOTE — ANESTHESIA PREPROCEDURE EVALUATION
Relevant Problems   No relevant active problems       Anesthesia ROS/MED HX    Anesthesia History - neg  Pulmonary - neg  Neuro/Psych - neg  Cardiovascular- neg  Hematological - neg  GI/Hepatic- neg  Musculoskeletal   Arthritis  Renal Disease- neg  Endo/Other- neg       Past Surgical History:   Procedure Laterality Date   • KNEE ARTHROSCOPY, MEDIAL PATELLO FEMORAL LIGAMENT RECONSTRUCTION W/ HAMSTRING GRAFT         Physical Exam    Airway   Mallampati: II   TM distance: >3 FB   Neck ROM: full  Cardiovascular - normal   Rhythm: regular   Rate: normalPulmonary - normal   clear to auscultation  Dental - normal      Patient Active Problem List   Diagnosis   • Closed left hip fracture, initial encounter (CMS/[facility])        Past Medical History:   Diagnosis Date   • Acne        Past Surgical History:   Procedure Laterality Date   • KNEE ARTHROSCOPY, MEDIAL PATELLO FEMORAL LIGAMENT RECONSTRUCTION W/ HAMSTRING GRAFT         Current Facility-Administered Medications   Medication Dose Route Frequency   • alum-mag hydroxide-simeth  30 mL oral q4h PRN   • glucose  16-32 g of dextrose oral PRN    Or   • dextrose  15-30 g of dextrose oral PRN    Or   • glucagon  1 mg intramuscular PRN    Or   • dextrose in water  25 mL intravenous PRN   • sennosides-docusate sodium  1 tablet oral BID       Prior to Admission medications    Medication Sig Start Date End Date Taking? Authorizing Provider   CALCIUM ORAL Take 1 tablet by mouth See admin instr.    Cristal Marquez MD   multivitamin liquid Take 1 tablet by mouth See admin instr.    Cristal Marquez MD   spironolactone (ALDACTONE) 100 mg tablet Take 100 mg by mouth daily. 12/21/21   Cristal Marquez MD       CBC Results       01/28/22     1117    WBC 12.52    RBC 3.82    HGB 12.7    HCT 37.8    MCV 99.0    MCH 33.2    MCHC 33.6              BMP Results       01/28/22     1117        K 4.2    Cl 99    CO2 24    Glucose 95    BUN 23    Creatinine 0.9    Calcium  10.1    Anion Gap 13    EGFR >60.0          No results found for: HCGPREGUR, PREGSERUM, HCG, HCGQUANT    Results from last 7 days   Lab Units 01/28/22  1117   INR  1.1   PTT sec 24       MRI HIP LEFT WITHOUT CONTRAST   Final Result   IMPRESSION:   1.  Pelvic ultrasound is suggested for bilateral right larger than left ovarian   cysts including a right ovarian cyst that may be thinly septated (versus 2   adjacent cysts) measuring 2.4 cm. This can be performed electively.   2.  The left greater trochanteric fracture does extend into the   intertrochanteric region as a nondisplaced intertrochanteric fracture.   3.  Hematoma/fluid collection about the left greater trochanter/trochanteric   bursae with grade 2 strain/partial tear of the left quadratus femoris and   associated tendon strain/partial tearing as discussed below.      Findings and recommendations in the first 2 impressions discussed with Lorin Ndiaye PA at 3:48 PM 1/28/2022   In [place],  the patient will receive a letter notifying them   to follow up with their physician.   COMMENT:      Comparison: January 28, 2022 x-ray.      TECHNIQUE: MRI of the left hip was performed using a dedicated noncontrast   protocol which also includes  imaging through the bony pelvis.      FINDINGS:      Left hip: Acute fracture at the left greater trochanter. In addition, best seen   on the large field-of-view T1 series there is a small fracture line extending   into the intertrochanteric region with associated edema.   No left hip osteonecrosis. No additional fracture. No joint effusion. Small   fluid in the left hip joint slightly greater than physiologic. There is   compatible with a minimal joint effusion. The ligamentum teres appears slightly   degenerated but intact. No high-grade chondral defect. Limited assessment of the   labrum. Noncontrast technique and motion reveals no gross findings of concern.   There is a hematoma along the  posterior left greater tuberosity.      Right hip: Survey imaging of the right hip reveals no fracture or osteonecrosis.   No joint effusion or significant degenerative arthropathy is evident.      Bony pelvis: Pubic symphysis and the sacroiliac joints appear within normal   limits. No bony pelvic fracture. No suspicious bone marrow replacing lesion.   Hematopoietic bone marrow is incidentally noted. There is a small Tarlov cysts   in the sacrum.      Tendons and musculature:   At the left greater trochanter, there is a strain/low-grade partial tear of the   gluteus medius without full-thickness avulsion. Moderate grade partial tear of   the left gluteus minimus without full-thickness avulsion. Fluid and hematoma in   the left trochanteric bursa related to recent injury.   There is a extensively edematous appearance of the left quadratus femoris which   is thickened and probably moderately partially torn from its femoral attachment.   Soft tissue contusion along the lateral left thigh proximally. There is probably   a small amount of partial tearing involving the proximal left gluteus treva   from its posterior proximal femoral attachment.   Mild right-sided gluteus medius and minimus tendinosis without tear.   Mild bilateral hamstring tendinosis without tear.   A mild degree of piriformis, obturator internus and gemellus strain is noted at   the left hip without full-thickness avulsion.   Remaining tendons about the bony pelvis are intact.      No muscle atrophy. No additional significant strain.      Visceral pelvis: Limited survey of the visceral pelvis reveals a small uterine   fibroid at the fundus measuring about 0.8 cm. Additional tiny fibroids are also   suspected elsewhere. Multiple bilateral ovarian cysts are noted incompletely   assessed. Pelvic ultrasound follow-up would be recommended.   No gross additional findings of concern in the pelvis.      X-RAY HIP WITH OR WITHOUT PELVIS 2-3 VW LEFT   Final  Result   IMPRESSION: See above. Findings discussed with Steven Deluna at 10:50 AM on   January 28, 2022      ECG 12 lead    (Results Pending)   X-RAY CHEST 1 VIEW    (Results Pending)       Anesthesia Plan    Plan: general    Technique: general endotracheal     Lines and Monitors: PIV     Airway: oral intubation   ASA 2  Blood Products:   Use of Blood Products Discussed: No     Consented to blood products  Anesthetic plan and risks discussed with: patient  Induction:    intravenous   Postop Plan:   Patient Disposition: inpatient floor planned admission   Pain Management: IV analgesics

## 2022-01-28 NOTE — ASSESSMENT & PLAN NOTE
Patient is very active with good exercise tolerance of > 4 mets  She rides [place] bike every day for 30 mts  She denies any exertional chest pain or sob and her exercise tolerance is > 4 mets  She denies any history of cad/mi/stroke  She denies any bowel or bladder disturbance  She denies any history of dvt/pe  She denies any snoring  ekg shows normal sinus rhythm with small q waves in septal leads- no old ekg to compare.   She is at acceptable risk for surgery  She does have bilateral carotid bruit and I would recommend carotid ultrasound as outpatient

## 2022-01-29 LAB
ANION GAP SERPL CALC-SCNC: 9 MEQ/L (ref 3–15)
BUN SERPL-MCNC: 28 MG/DL (ref 8–20)
CALCIUM SERPL-MCNC: 8.7 MG/DL (ref 8.9–10.3)
CHLORIDE SERPL-SCNC: 100 MEQ/L (ref 98–109)
CO2 SERPL-SCNC: 24 MEQ/L (ref 22–32)
CREAT SERPL-MCNC: 1 MG/DL (ref 0.6–1.1)
ERYTHROCYTE [DISTWIDTH] IN BLOOD BY AUTOMATED COUNT: 12.3 % (ref 11.7–14.4)
GFR SERPL CREATININE-BSD FRML MDRD: 54.5 ML/MIN/1.73M*2
GLUCOSE SERPL-MCNC: 113 MG/DL (ref 70–99)
HCT VFR BLDCO AUTO: 29.5 % (ref 35–45)
HGB BLD-MCNC: 10 G/DL (ref 11.8–15.7)
MCH RBC QN AUTO: 33.3 PG (ref 28–33.2)
MCHC RBC AUTO-ENTMCNC: 33.9 G/DL (ref 32.2–35.5)
MCV RBC AUTO: 98.3 FL (ref 83–98)
PDW BLD AUTO: 9.9 FL (ref 9.4–12.3)
PLATELET # BLD AUTO: 237 K/UL (ref 150–369)
POTASSIUM SERPL-SCNC: 4.9 MEQ/L (ref 3.6–5.1)
RBC # BLD AUTO: 3 M/UL (ref 3.93–5.22)
SODIUM SERPL-SCNC: 133 MEQ/L (ref 136–144)
WBC # BLD AUTO: 8.46 K/UL (ref 3.8–10.5)

## 2022-01-29 PROCEDURE — 25800000 HC PHARMACY IV SOLUTIONS: Performed by: STUDENT IN AN ORGANIZED HEALTH CARE EDUCATION/TRAINING PROGRAM

## 2022-01-29 PROCEDURE — 25800000 HC PHARMACY IV SOLUTIONS: Performed by: HOSPITALIST

## 2022-01-29 PROCEDURE — 80048 BASIC METABOLIC PNL TOTAL CA: CPT | Performed by: STUDENT IN AN ORGANIZED HEALTH CARE EDUCATION/TRAINING PROGRAM

## 2022-01-29 PROCEDURE — 63600000 HC DRUGS/DETAIL CODE: Performed by: STUDENT IN AN ORGANIZED HEALTH CARE EDUCATION/TRAINING PROGRAM

## 2022-01-29 PROCEDURE — 97161 PT EVAL LOW COMPLEX 20 MIN: CPT | Mod: GP | Performed by: PHYSICAL THERAPIST

## 2022-01-29 PROCEDURE — 63700000 HC SELF-ADMINISTRABLE DRUG: Performed by: STUDENT IN AN ORGANIZED HEALTH CARE EDUCATION/TRAINING PROGRAM

## 2022-01-29 PROCEDURE — 63700000 HC SELF-ADMINISTRABLE DRUG: Performed by: ORTHOPAEDIC SURGERY

## 2022-01-29 PROCEDURE — 36415 COLL VENOUS BLD VENIPUNCTURE: CPT | Performed by: STUDENT IN AN ORGANIZED HEALTH CARE EDUCATION/TRAINING PROGRAM

## 2022-01-29 PROCEDURE — 99232 SBSQ HOSP IP/OBS MODERATE 35: CPT | Performed by: HOSPITALIST

## 2022-01-29 PROCEDURE — 97530 THERAPEUTIC ACTIVITIES: CPT | Mod: GP | Performed by: PHYSICAL THERAPIST

## 2022-01-29 PROCEDURE — 97535 SELF CARE MNGMENT TRAINING: CPT | Mod: GO

## 2022-01-29 PROCEDURE — 97166 OT EVAL MOD COMPLEX 45 MIN: CPT | Mod: GO

## 2022-01-29 PROCEDURE — 85027 COMPLETE CBC AUTOMATED: CPT | Performed by: STUDENT IN AN ORGANIZED HEALTH CARE EDUCATION/TRAINING PROGRAM

## 2022-01-29 PROCEDURE — 12000000 HC ROOM AND CARE MED/SURG

## 2022-01-29 RX ORDER — BISMUTH SUBSALICYLATE 262 MG/1
262 TABLET ORAL
Status: DISCONTINUED | OUTPATIENT
Start: 2022-01-29 | End: 2022-01-31 | Stop reason: HOSPADM

## 2022-01-29 RX ORDER — SODIUM CHLORIDE 9 MG/ML
INJECTION, SOLUTION INTRAVENOUS ONCE
Status: COMPLETED | OUTPATIENT
Start: 2022-01-29 | End: 2022-01-29

## 2022-01-29 RX ADMIN — ACETAMINOPHEN 650 MG: 325 TABLET, FILM COATED ORAL at 05:52

## 2022-01-29 RX ADMIN — ACETAMINOPHEN 650 MG: 325 TABLET, FILM COATED ORAL at 18:31

## 2022-01-29 RX ADMIN — SENNOSIDES AND DOCUSATE SODIUM 1 TABLET: 50; 8.6 TABLET ORAL at 08:42

## 2022-01-29 RX ADMIN — ACETAMINOPHEN 650 MG: 325 TABLET, FILM COATED ORAL at 13:21

## 2022-01-29 RX ADMIN — BISMUTH SUBSALICYLATE 262 MG: 262 TABLET, CHEWABLE ORAL at 21:52

## 2022-01-29 RX ADMIN — ACETAMINOPHEN 650 MG: 325 TABLET, FILM COATED ORAL at 08:42

## 2022-01-29 RX ADMIN — ASPIRIN 81 MG CHEWABLE TABLET 81 MG: 81 TABLET CHEWABLE at 08:42

## 2022-01-29 RX ADMIN — VANCOMYCIN HYDROCHLORIDE 750 MG: 750 INJECTION, POWDER, LYOPHILIZED, FOR SOLUTION INTRAVENOUS at 05:52

## 2022-01-29 RX ADMIN — ACETAMINOPHEN 650 MG: 325 TABLET, FILM COATED ORAL at 23:08

## 2022-01-29 RX ADMIN — ACETAMINOPHEN 650 MG: 325 TABLET, FILM COATED ORAL at 01:44

## 2022-01-29 RX ADMIN — POLYETHYLENE GLYCOL 3350 17 G: 17 POWDER, FOR SOLUTION ORAL at 08:42

## 2022-01-29 RX ADMIN — SODIUM CHLORIDE: 9 INJECTION, SOLUTION INTRAVENOUS at 16:18

## 2022-01-29 RX ADMIN — SENNOSIDES AND DOCUSATE SODIUM 1 TABLET: 50; 8.6 TABLET ORAL at 20:46

## 2022-01-29 RX ADMIN — ASPIRIN 81 MG CHEWABLE TABLET 81 MG: 81 TABLET CHEWABLE at 20:46

## 2022-01-29 ASSESSMENT — COGNITIVE AND FUNCTIONAL STATUS - GENERAL
STANDING UP FROM CHAIR USING ARMS: 2 - A LOT
AFFECT: WFL
EATING MEALS: 4 - NONE
WALKING IN HOSPITAL ROOM: 2 - A LOT
HELP NEEDED FOR PERSONAL GROOMING: 3 - A LITTLE
TOILETING: 3 - A LITTLE
AFFECT: ANXIOUS
CLIMB 3 TO 5 STEPS WITH RAILING: 2 - A LOT
DRESSING REGULAR UPPER BODY CLOTHING: 3 - A LITTLE
DRESSING REGULAR LOWER BODY CLOTHING: 2 - A LOT
HELP NEEDED FOR BATHING: 2 - A LOT
MOVING TO AND FROM BED TO CHAIR: 2 - A LOT

## 2022-01-29 NOTE — PROGRESS NOTES
Patient: Julisa LANDIN Post  Location:  [facility] 5PAV [id number]  MRN:  [id number]  Today's date:  1/29/2022    Patient seated in recliner s/p PT session, BLE elevated, VSS, NAD, no c/o chest pain, SOB or nausea, no neuro signs/symptoms, Lacho Alarm re-set, Nurse notified of PT session results and recommendations     Julisa is a 71 y.o. female admitted on 1/28/2022 with Closed left hip fracture, initial encounter (CMS/[facility]) [S72.002A]. Principal problem is Closed left hip fracture, initial encounter (CMS/[facility]).    Past Medical History  Julisa has a past medical history of Acne and Osteoporosis.    History of Present Illness  71 y.o. year old female who presented to the hospital after a fall 1/28/22 with an inability to bear weight. The patient was diagnosed with a left intertrochanteric femur fracture.  S/p L femur IMN 1/28/22. WBAT LLE       PT Vitals    Date/Time Pulse SpO2 Pt Activity O2 Therapy BP Pt Position Observations [facility]   01/29/22 1045 76 95 % At rest None (Room air) 111/54 Lying -- EMS   01/29/22 1110 70 -- -- -- 107/53 Sitting After OT/PT session EMS      PT Pain    Date/Time Pain Type Side/Orientation Location Rating: Rest Rating: Activity Interventions [facility]   01/29/22 1045 Pain Assessment left hip 3 7 position adjusted EMS          Prior Living Environment      Most Recent Value   Current Living Arrangements apartment   Living Environment Comment Normally lives alone in apartment in [facility], 0STE and elevator, tub shower. Is in [place] visiting family, can return home with family if absolutely needed, however wishes to get back to IND baseline and return to [facility]        Prior Level of Function      Most Recent Value   Ambulation independent   Transferring independent   Toileting independent   Bathing independent   Dressing independent   Eating independent   Prior Level of Function Comment IND with ADLs, IADLs, functional mobility without AD. Is a , however not often as lives in [place]. +works full time as  psychotherapist/SW.           PT Evaluation and Treatment - 01/29/22 1044        PT Time Calculation    Start Time 1044     Stop Time 1114     Time Calculation (min) 30 min        Session Details    Document Type initial evaluation     Mode of Treatment physical therapy        General Information    Patient Profile Reviewed yes     Onset of Illness/Injury or Date of Surgery 01/28/22     Referring Physician Dr Courtney     Patient/Family/Caregiver Comments/Observations cleared by 5p nurse     General Observations of Patient L hip ORIF Dr Courtney 1/28/22     Existing Precautions/Restrictions aspiration;cardiac;fall;head of bed elevated 30 degrees;weight bearing   WBAT LLE, SCD's, I+O, OOB day 0       Weight-bearing Status    Left LE Weight-Bearing Status weight-bearing as tolerated (WBAT)        Cognition/Psychosocial    Affect/Mental Status (Cognition) WFL     Orientation Status (Cognition) oriented x 4     Follows Commands (Cognition) WFL     Cognitive Function WFL        Sensory    Hearing Status WFL        Vision Assessment/Intervention    Visual Impairment/Limitations corrective lenses full-time        Sensory Assessment (Somatosensory)    Sensory Assessment (Somatosensory) sensation intact        Range of Motion (ROM)    Range of Motion ROM is WFL     Comment: Range of Motion LLE NT        Strength (Manual Muscle Testing)    Strength (Manual Muscle Testing) strength is WFL        Strength Comprehensive (MMT)    Comment LLE NT        Bed Mobility    [place], Supine to Sit close supervision;increased time to complete;verbal cues;nonverbal cues (demo/gesture)     Verbal Cues (Supine to Sit) hand placement;preparatory posture;technique     Assistive Device head of bed elevated        Transfers    Transfers toilet transfer;shower transfer;car transfer     Maintains Weight-Bearing Status (Transfers) able to maintain        Sit to Stand Transfer    [place], Sit to Stand Transfer minimum assist (75% or  more patient effort);nonverbal cues (demo/gesture);verbal cues     Verbal Cues hand placement;preparatory posture;proper use of assistive device;safety;technique     Assistive Device walker, front-wheeled        Stand to Sit Transfer    [place], Stand to Sit Transfer minimum assist (75% or more patient effort);nonverbal cues (demo/gesture);verbal cues     Verbal Cues hand placement;preparatory posture;proper use of assistive device;safety;technique     Assistive Device walker, front-wheeled        Gait Training    [place], Gait minimum assist (75% or more patient effort);nonverbal cues (demo/gesture);verbal cues     Assistive Device walker, front-wheeled     Distance in Feet 12 feet     Pattern (Gait) step-to     Deviations/Abnormal Patterns (Gait) marzena decreased;gait speed decreased;step length decreased;stride length decreased;weight shifting decreased     Maintains Weight-bearing Status (Gait) able to maintain     Advanced Gait Activity curb negotiation;10 Meter Walk Test (Self-Selected Velocity)     Comment (Gait/Stairs) amb 12 ft x 2 w/ RW at Min A, slow, slightly antalgic, VSS/NAD, nurse notified        Curb Negotiation    [place] unable to assess        Stairs Training    [place], Stairs unable to assess        Safety Issues, Functional Mobility    Impairments Affecting Function (Mobility) pain;range of motion (ROM);strength        Balance    Balance Assessment sitting static balance;sitting dynamic balance;standing static balance;sit to stand dynamic balance;standing dynamic balance     Static Sitting Balance WFL     Dynamic Sitting Balance mild impairment     Sit to Stand Dynamic Balance mild impairment     Static Standing Balance mild impairment     Dynamic Standing Balance mild impairment        Motor Skills    Motor Skills coordination;functional endurance     Coordination minimal impairment     Functional Endurance mild decr     Comment, Motor Skills limited by pain        AM-PAC  (TM) - Mobility (Current Function)    Turning from your back to your side while in a flat bed without using bedrails? 3 - A Little     Moving from lying on your back to sitting on the side of a flat bed without using bedrails? 3 - A Little     Moving to and from a bed to a chair? 2 - A Lot     Standing up from a chair using your arms? 2 - A Lot     To walk in a hospital room? 2 - A Lot     Climbing 3-5 steps with a railing? 2 - A Lot     AM-PAC (TM) Mobility Score 14        Assessment/Plan (PT)    Daily Outcome Statement Min A func mob, amb w/ RW at Min A, AMPAC 14/24, excellent motivation, lives alone, change from baseline indep, PT recommends acute rehab stay     Rehab Potential good, to achieve stated therapy goals     Therapy Frequency 5-7 times/wk     Planned Therapy Interventions balance training;bed mobility training;home exercise program;gait training;patient/family education;ROM (range of motion);stair training;strengthening;transfer training               PT Assessment/Plan      Most Recent Value   PT Recommended Discharge Disposition acute rehab/Inpatient Rehab Facility at 01/29/2022 1044   Anticipated Equipment Needs at Discharge (PT) walker, front-wheeled, commode, 3-in-1, shower chair at 01/29/2022 1044   Patient/Family Therapy Goals Statement go to [facility] at 01/29/2022 1044        Education Documentation  VTE Symptoms, taught by Willy Mims, PT at 1/29/2022  2:33 PM.  Learner: Patient  Readiness: Acceptance  Method: Explanation, Demonstration  Response: Demonstrated Understanding, Verbalizes Understanding  Comment: bed mob, xfers, GT w/ RW, PT POC, safety at home, fall risk reduction, skin protection, ADL's, bathroom xfers, WBAT LLE, importance of daily mobility to prevent health consequences    VTE Prevention, taught by Willy Mims, PT at 1/29/2022  2:33 PM.  Learner: Patient  Readiness: Acceptance  Method: Explanation, Demonstration  Response: Demonstrated Understanding, Verbalizes  Understanding  Comment: bed mob, xfers, GT w/ RW, PT POC, safety at home, fall risk reduction, skin protection, ADL's, bathroom xfers, WBAT LLE, importance of daily mobility to prevent health consequences    Rehabilitation Therapy, taught by Willy Mims PT at 1/29/2022  2:33 PM.  Learner: Patient  Readiness: Acceptance  Method: Explanation, Demonstration  Response: Demonstrated Understanding, Verbalizes Understanding  Comment: bed mob, xfers, GT w/ RW, PT POC, safety at home, fall risk reduction, skin protection, ADL's, bathroom xfers, WBAT LLE, importance of daily mobility to prevent health consequences    VTE Symptoms, taught by Willy Mims PT at 1/29/2022  2:33 PM.  Learner: Patient  Readiness: Acceptance  Method: Explanation, Demonstration  Response: Demonstrated Understanding, Verbalizes Understanding  Comment: bed mob, xfers, GT w/ RW, PT POC, safety at home, fall risk reduction, skin protection, ADL's, bathroom xfers, WBAT LLE, importance of daily mobility to prevent health consequences    VTE Prevention, taught by Willy Mims PT at 1/29/2022  2:33 PM.  Learner: Patient  Readiness: Acceptance  Method: Explanation, Demonstration  Response: Demonstrated Understanding, Verbalizes Understanding  Comment: bed mob, xfers, GT w/ RW, PT POC, safety at home, fall risk reduction, skin protection, ADL's, bathroom xfers, WBAT LLE, importance of daily mobility to prevent health consequences    Rehabilitation Therapy, taught by Willy Mims PT at 1/29/2022  2:33 PM.  Learner: Patient  Readiness: Acceptance  Method: Explanation, Demonstration  Response: Demonstrated Understanding, Verbalizes Understanding  Comment: bed mob, xfers, GT w/ RW, PT POC, safety at home, fall risk reduction, skin protection, ADL's, bathroom xfers, WBAT LLE, importance of daily mobility to prevent health consequences    Home Safety, taught by Willy Mims PT at 1/29/2022  2:33 PM.  Learner: Patient  Readiness:  Acceptance  Method: Explanation, Demonstration  Response: Demonstrated Understanding, Verbalizes Understanding  Comment: bed mob, xfers, GT w/ RW, PT POC, safety at home, fall risk reduction, skin protection, ADL's, bathroom xfers, WBAT LLE, importance of daily mobility to prevent health consequences    Energy Conservation, taught by Willy Mims PT at 1/29/2022  2:33 PM.  Learner: Patient  Readiness: Acceptance  Method: Explanation, Demonstration  Response: Demonstrated Understanding, Verbalizes Understanding  Comment: bed mob, xfers, GT w/ RW, PT POC, safety at home, fall risk reduction, skin protection, ADL's, bathroom xfers, WBAT LLE, importance of daily mobility to prevent health consequences    Fall Prevention, taught by Willy Mims PT at 1/29/2022  2:33 PM.  Learner: Patient  Readiness: Acceptance  Method: Explanation, Demonstration  Response: Demonstrated Understanding, Verbalizes Understanding  Comment: bed mob, xfers, GT w/ RW, PT POC, safety at home, fall risk reduction, skin protection, ADL's, bathroom xfers, WBAT LLE, importance of daily mobility to prevent health consequences    Equipment/Home Supplies, taught by Willy Mims PT at 1/29/2022  2:33 PM.  Learner: Patient  Readiness: Acceptance  Method: Explanation, Demonstration  Response: Demonstrated Understanding, Verbalizes Understanding  Comment: bed mob, xfers, GT w/ RW, PT POC, safety at home, fall risk reduction, skin protection, ADL's, bathroom xfers, WBAT LLE, importance of daily mobility to prevent health consequences    Call Light Use, taught by Willy Mims PT at 1/29/2022  2:33 PM.  Learner: Patient  Readiness: Acceptance  Method: Explanation, Demonstration  Response: Demonstrated Understanding, Verbalizes Understanding  Comment: bed mob, xfers, GT w/ RW, PT POC, safety at home, fall risk reduction, skin protection, ADL's, bathroom xfers, WBAT LLE, importance of daily mobility to prevent health consequences          PT  Goals      Most Recent Value   Bed Mobility Goal 1    Activity/Assistive Device bed mobility activities, all at 01/29/2022 1044   [place] independent at 01/29/2022 1044   Time Frame 5-7 days at 01/29/2022 1044   Progress/Outcome goal ongoing at 01/29/2022 1044   Transfer Goal 1    Activity/Assistive Device all transfers, walker, front-wheeled at 01/29/2022 1044   [place] modified independence at 01/29/2022 1044   Time Frame 5-7 days at 01/29/2022 1044   Progress/Outcome goal ongoing at 01/29/2022 1044   Gait Training Goal 1    Activity/Assistive Device gait (walking locomotion), walker, front-wheeled at 01/29/2022 1044   [place] modified independence at 01/29/2022 1044   Distance 200 ft at 01/29/2022 1044   Time Frame 5-7 days at 01/29/2022 1044   Progress/Outcome goal ongoing at 01/29/2022 1044   Stairs Goal 1    Activity/Assistive Device ascending stairs, descending stairs, cane, straight at 01/29/2022 1044   [place] modified independence at 01/29/2022 1044   Number of Stairs 12 at 01/29/2022 1044   Time Frame 5-7 days at 01/29/2022 1044   Progress/Outcome goal ongoing at 01/29/2022 1044

## 2022-01-29 NOTE — PLAN OF CARE
Problem: Adult Inpatient Plan of Care  Goal: Plan of Care Review  Outcome: Progressing  Flowsheets (Taken 1/29/2022 3619)  Progress: improving  Plan of Care Reviewed With: patient  Outcome Summary: OT eval completed, pt overall limited by increased pain and dec'd functional endurance and balance s/p L femur nailing. Pt able to ambulate functional distance to bathroom, however with increased time and effort. [place] grossly for functional txfers, with increased time and effort as well. Pt reqs use of RW given pain and dec'd balance. Ax1 for all LB ADLs, reqs seated position for grooming tasks, and completed toileting task at [place]. Pt motivated to return to IND PLOF and wants to return to [facility] apartment at [facility] level. Rec acute rehab upon DC, pt's goal is to go to [facility].

## 2022-01-29 NOTE — PROGRESS NOTES
"    Hospital Medicine Service -  Daily Progress Note       SUBJECTIVE   Interval History: Overall, she feels that she is doing well.  She does have some hip discomfort particularly with trying to walk but she was able to walk with a walker.  She denies any chest pains or shortness of breath.  Voiding okay.     OBJECTIVE      Vital signs in last 24 hours:  Temp:  [36.6 °C (97.8 °F)-37.4 °C (99.4 °F)] 37.2 °C (98.9 °F)  Heart Rate:  [] 70  Resp:  [12-43] 16  BP: ()/(47-70) 107/53    Intake/Output Summary (Last 24 hours) at 1/29/2022 1446  Last data filed at 1/29/2022 1131  Gross per 24 hour   Intake 300 ml   Output 950 ml   Net -650 ml       PHYSICAL EXAMINATION      Physical Exam  Visit Vitals  BP (!) 107/53 (Patient Position: Sitting)   Pulse 70   Temp 37.2 °C (98.9 °F) (Temporal)   Resp 16   Ht 1.66 m (5' 5.35\")   Wt 52.6 kg (115 lb 15.4 oz)   SpO2 95%   BMI 19.09 kg/m²       Physical Exam  General Appearance:        Awake, Alert, Oriented x3   Head:    Normocephalic   Eyes:    No icterus   Respiratory:     Clear to auscultation bilaterally   Cardiovascular   Regular rate and rhythm   GI:     Soft, non-tender, bowel sounds active    Psychiatric   Appropriate, cooperative      LINES, CATHETERS, DRAINS, AIRWAYS, AND WOUNDS   Lines, Drains, and Airways:  Wounds (agree with documentation and present on admission):  Peripheral IV (Adult) 01/28/22 Anterior;Distal;Left Forearm (Active)   Number of days: 1       Surgical Incision Hip Anterior;Left;Proximal (Active)   Number of days: 1       Surgical Incision Hip Left (Active)   Number of days: 1         Comments:      LABS / IMAGING / TELE      Labs  Results from last 7 days   Lab Units 01/29/22  0415   WBC K/uL 8.46   HEMOGLOBIN g/dL 10.0*   HEMATOCRIT % 29.5*   PLATELETS K/uL 237     Results from last 7 days   Lab Units 01/29/22  0415   SODIUM mEQ/L 133*   POTASSIUM mEQ/L 4.9   CHLORIDE mEQ/L 100   CO2 mEQ/L 24   BUN mg/dL 28*   CREATININE mg/dL 1.0   GLUCOSE " mg/dL 113*   CALCIUM mg/dL 8.7*         SARS-CoV-2 (COVID-19) (no units)   Date/Time Value   01/28/2022 1128 Negative       ASSESSMENT AND PLAN      * Closed left hip fracture, initial encounter (CMS/[facility])  Assessment & Plan  71 yr old female with no significant past medical history presenting with mechanical fall leading to left hip fracture  S/p Left Femur Cephalomedullary Nail on 1/28/22  Continue PT/OT  Encourage IS  Continue Bowel regimen  DVT prophylaxis and pain meds per ortho    Other acne  Assessment & Plan  Hold aldactone for now    Leucocytosis  Assessment & Plan  WBC improved today.  Afebrile.  No UA sent.  Chest x-ray not suggestive of pneumonia    Bilateral ovarian cysts  Assessment & Plan  Recommend outpatient follow up with gyn    Bilateral carotid bruits  Assessment & Plan  Patient denies any history of stroke or tia  outpatient f/u for carotid ultrasound              Reggie Chan, DO  1/29/2022

## 2022-01-29 NOTE — PROGRESS NOTES
Orthopaedic Surgery Progress Note     Interval History:   Julisa Tyler (71 y.o. female) is resting comfortably in bed with no acute events overnight. Had assistance ambulating to bathroom last evening    Vital Signs:   Vitals:    01/29/22 0400   BP: (!) 101/55   Pulse: 66   Resp: 16   Temp: 36.6 °C (97.8 °F)   SpO2: 98%       Labs:   CBC   CBC Results       01/29/22 01/28/22     0415 1117    WBC 8.46 12.52    RBC 3.00 3.82    HGB 10.0 12.7    HCT 29.5 37.8    MCV 98.3 99.0    MCH 33.3 33.2    MCHC 33.9 33.6     302         Comment for HGB at 0415 on 01/29/22: RESULT CHECKED          Physical Exam:   General:   No acute distress   Symmetric chest rise bilaterally with normal effort   Mood and affect are appropriate     Musculoskeletal:   Left Lower Extremity   Inspection: Surgical dressing is clean, dry, and intact. Appropriate tenderness around the hip, no obvious deformity or erythema  Motor: Fires IP/Q/TA/EHL/GS   Sensory: Intact to light touch in SPN/DPN/tibial/saphenous/sural distributions   Vascular: Palpable DP pulse. Toes perfused   Compartments soft and compressible     Assessment and Plan  Julisa Tyler is a 71 y.o. female who is s/p L femur IMN    -- POD: 1 Day Post-Op   -- WBS: WBAT   -- DVT PPX: ASA   -- PT OT: recs appreciated  -- [facility] recs appreciated    Length of stay: 1      Ludivina Villeda MD   Orthopaedic Surgery PGY-1

## 2022-01-29 NOTE — OP NOTE
Operative Report    Patient:  Julisa Tyler  :  [id number]  MRN:  [id number]  Date of Procedure:  22      Pre-operative Diagnosis:  Left Intertrochanteric Femur Fracture    Post-operative Diagnosis:  Left Intertrochanteric Femur Fracture    Operation: Left Femur Cephalomedullary Nail    Surgeon:  Halima Courtney MD    Assistant(s):  N/A    Anesthesia:  General    Estimated Blood Loss:  50 mL    Specimens:  None    Drains:  None    Disposition: awakened from anesthesia, extubated and taken to the recovery room in a stable condition, having suffered no apparent untoward event.      History of Present Illness:  Ms. Tyler is a 71 y.o. year old female who presented to the hospital after a fall with an inability to bear weight. The patient was diagnosed with a left intertrochanteric femur fracture.  It was felt that the patient would benefit from surgical management with a cephalomedullary nail.  A detailed conversation regarding the risks and benefits of cephalomedullary nail surgery was had with the patient.  The risks discussed included but were not limited to: bleeding (which may or may not require transfusion), infection, damage to nerves or blood vessels, deep venous thrombosis, pulmonary embolus, prosthetic failure, loosening, prosthetic fracture, femur or pelvic fracture, malunion, nonunion, leg-length inequality, persistent pain, need for future surgery, medical complications (including cardiac, respiratory and neurologic complications), anaesthetic complications, and death.  Subsequent to this conversation, all of the patient's questions were answered in great detail and informed consent was obtained for a left cephalomedullary nail.  She received preoperative medical clearance and was felt optimized for surgery.    Description of Procedure:  After positioning the patient on the fracture table, the intertrochanteric femur fracture was then closed reduced on AP and lateral fluoroscopic views.  After  routine preparation and draping of the patient in the operating room, a clinical time-out was held confirming the correct patient name, MRN, , planned procedure, site, antibiotic start time and agent, and outline of any surgical concerns.  All in attendance were in agreement to proceed.  A lateral incision was made for the nail insertion and the starting point identified on AP and lateral fluoroscopic views.  Once the guidepin was confirmed to be in the correct position, the opening reamer was used to prepare the femur to accept a 10x170 millimeter nail.  The nail was inserted demonstrating appropriate position on AP and lateral views with maintenance of the fracture reduction.  The proximal femur was then prepared to accept a 80 millimeter cephalomedullary screw and the screw was locked in place.  A 30 millimeter distal static interlocking screw was then placed using the aiming guide.  AP and lateral views were then obtained of the proximal and more distal portion of the femur demonstrating appropriate placement of the hardware and fracture reduction.  The wound was closed in layers with the skin closed using absorbable suture and a dressing was applied to the wound.    Solidarium TFNA components were utilized for this procedure.    Implant Name Type Inv. Item Serial No.  Lot No. LRB No. Used Action   NAIL TFNA 10MM/130 DEG TI JARED 170MM - STERILE - SN/A - [id number] Intramedullary nail NAIL TFNA 10MM/130 DEG TI JARED 170MM - STERILE N/A [employer] [id number] Left 1 Implanted   SCREW TFNA 80MM - STERILE - [id number]  SCREW TFNA 80MM - STERILE  [employer] [id number] Left 1 Implanted   SCREW 5.0MM TI LOCKING  W/T25 STARDRIVE 30MM F/IM NAIL STER - [id number] Bone screw SCREW 5.0MM TI LOCKING  W/T25 STARDRIVE 30MM F/IM NAIL STER  [employer] [id number] Left 1 Implanted       I was present and scrubbed throughout all the critical components of the operation.    Halima Courtney MD

## 2022-01-29 NOTE — ANESTHESIA POSTPROCEDURE EVALUATION
Patient: Julisa LANDIN Post    Procedure Summary     Date: 01/28/22 Room / Location: [facility] PAV OR 06 / [facility] OR [facility]    Anesthesia Start: 1807 Anesthesia Stop: 1941    Procedure: OPEN REDUCTION INTERNAL FIXATION HIP/FEMUR FRACTURE TFN--TROCHANTERIC FIXATION NAIL/GAMMA/T2 NAIL (Left Hip) Diagnosis:       Closed left hip fracture, initial encounter (CMS/[facility])      (Closed left hip fracture, initial encounter (CMS/[facility]) [S72.002A])    Surgeons: Halima Courtney MD Responsible Provider: Rebecca Reina DO    Anesthesia Type: general ASA Status: 2          Anesthesia Type: general  PACU Vitals    No data found in the last 10 encounters.           Anesthesia Post Evaluation    Pain score: 2  Pain management: adequate  Mode of pain management: IV medication  Patient location during evaluation: PACU  Patient participation: complete - patient participated  Level of consciousness: awake  Cardiovascular status: acceptable  Airway Patency: adequate  Respiratory status: acceptable  Hydration status: acceptable  Anesthetic complications: no

## 2022-01-29 NOTE — HOSPITAL COURSE
Julisa is a 71 y.o. female admitted on 1/28/2022 with Closed left hip fracture, initial encounter (CMS/[facility]) [S72.002A]. Principal problem is Closed left hip fracture, initial encounter (CMS/[facility]).    Past Medical History  Julisa has a past medical history of Acne and Osteoporosis.    History of Present Illness  71 y.o. year old female who presented to the hospital after a fall 1/28/22 with an inability to bear weight. The patient was diagnosed with a left intertrochanteric femur fracture.  S/p L femur IMN 1/28/22. WBAT LLE

## 2022-01-29 NOTE — PLAN OF CARE
Plan of Care Review  Plan of Care Reviewed With: patient  Progress: improving  Outcome Summary: Pt oob assixt x1-2 with RW. Pain 3/10 on numeric scale, scheduled Tylenol given for pain. Voids spontaneously on bedside commode. PT/OT to evaluate possible discharge today.

## 2022-01-29 NOTE — PROGRESS NOTES
Orthopaedic Surgery Post-Operative Check    Interval History:  Patient is resting comfortably in bed. Pain currently well controlled. Patient responds to questions appropriately and follows commands.    Vital Signs:   Vitals:    01/28/22 2140   BP:    Pulse:    Resp:    Temp:    SpO2: 97%       Physical Exam:  Inspection: Surgical dressing in place, dressing is clean, dry and intact  Motor: Fires the quad, flexes and extends the ankle and toes  Sensory: Intact to light touch in SPN/DPN/tibial/saphenous/sural distributions   Vascular: Palpable DP, toes perfused  Compartments soft and compressible     Assessment and Plan  71 y.o. female status post L femur CMN, doing well    - DVT prophylaxis  - Weight bearing as tolerated   - Analgesia  - Physical therapy    Ludivina Villeda MD  Orthopaedic Surgery PGY-1

## 2022-01-29 NOTE — PROGRESS NOTES
Patient:  Julisa LANDIN Post  Location:  [facility] 5PAV 5406  MRN:  [id number]  Today's date:  1/29/2022    Pt found lying supine in bed; RN cleared for session.  Ended session with pt seated OOB in chair, posey alarm on, call bell/personal items within reach, VSS/NAD, RN aware of session/ pt status.  Julisa is a 71 y.o. female admitted on 1/28/2022 with Closed left hip fracture, initial encounter (CMS/[facility]) [S72.002A]. Principal problem is Closed left hip fracture, initial encounter (CMS/[facility]).    Past Medical History  Julisa has a past medical history of Acne and Osteoporosis.    History of Present Illness   71 y.o. year old female who presented to the hospital after a fall with an inability to bear weight. The patient was diagnosed with a left intertrochanteric femur fracture.  S/p L femur IMN 1/28      OT Vitals    Date/Time Pulse SpO2 Pt Activity O2 Therapy BP Pt Position Observations [facility]   01/29/22 1045 76 95 % At rest None (Room air) 111/54 Lying -- EMS   01/29/22 1110 70 -- -- -- 107/53 Sitting After OT/PT session EMS      OT Pain    Date/Time Pain Type Side/Orientation Location Rating: Rest Rating: Activity Interventions [facility]   01/29/22 1045 Pain Assessment left hip 3 7 position adjusted EMS          Prior Living Environment      Most Recent Value   Current Living Arrangements apartment   Living Environment Comment [place] [facility], 0STE and elevator, tub shower. [place] in [place], can return home with family if absolutely needed, however wishes to get back to IND baseline and [facility]        Prior Level of Function      Most Recent Value   Ambulation independent   Transferring independent   Toileting independent   Bathing independent   Dressing independent   Eating independent   Prior Level of Function Comment IND with ADLs, IADLs, functional mobility without AD. Is a , however not often as lives in [place]. +works full time as psychotherapist/SW.         Occupational Profile      Most Recent Value   Reason for Services/Referral L femur IMN,  ADL intervention/dispo needs   Successful Occupations Works full time as a psychotherapist/SW, Enjoys traveling   Performance Patterns Enjoys exercising daily on [employer]   Environmental Supports and Barriers Supportive family   Patient Goals To go to [facility]           OT Evaluation and Treatment - 01/29/22 1043        OT Time Calculation    Start Time 1043     Stop Time 1114     Time Calculation (min) 31 min        Session Details    Document Type initial evaluation     Mode of Treatment occupational therapy        General Information    Patient Profile Reviewed yes     General Observations of Patient Pt lying supine in bed; agreeable to OT, RN cleared for session. PT present for cotx.     Existing Precautions/Restrictions weight bearing;fall        Weight-bearing Status    Left LE Weight-Bearing Status weight-bearing as tolerated (WBAT)        Cognition/Psychosocial    Affect/Mental Status (Cognition) anxious     Orientation Status (Cognition) oriented x 4     Follows Commands (Cognition) WFL     Cognitive Function WFL     Comment, Cognition Increased time to perform all functional tasks 2/2 anxiety. Pleasant and cooperative.        Hearing Assessment    Hearing Status WFL        Vision Assessment/Intervention    Visual Impairment/Limitations corrective lenses full-time        Sensory Assessment (Somatosensory)    Sensory Assessment (Somatosensory) UE sensation intact        Range of Motion (ROM)    Range of Motion bilateral upper extremities;ROM is WFL        Strength (Manual Muscle Testing)    Strength (Manual Muscle Testing) bilateral upper extremities;strength is WFL        Bed Mobility    [place], Supine to Sit close supervision     Comment (Bed Mobility) Increased time and effort to perform, pt motivated to perform I'ly today        Sit to Stand Transfer    [place], Sit to Stand Transfer 1 person assist;close  supervision     Verbal Cues hand placement;safety;technique     Assistive Device walker, front-wheeled     Comment Increased time to perform        Stand to Sit Transfer    [place], Stand to Sit Transfer minimum assist (75% or more patient effort);1 person assist     Verbal Cues hand placement;safety;technique     Assistive Device walker, front-wheeled        Toilet Transfer    Transfer Technique sit-stand;stand-sit     [place], Toilet Transfer minimum assist (75% or more patient effort);1 person assist     Verbal Cues hand placement;safety;technique     Comment Increased time, effort, and assist to perform regular height toilet txfer. Pt with increased anxiety regarding pain.        Safety Issues, Functional Mobility    Comment, Safety Issues/Impairments (Mobility) Able to ambulate functional distance from bed to bathroom and back with increased time and effort. See PT note for gait training/analysis details.        Balance    Balance Assessment sit to stand dynamic balance     Static Sitting Balance WFL;sitting, edge of bed     Sit to Stand Dynamic Balance mild impairment     Static Standing Balance mild impairment        Motor Skills    Functional Endurance ~min-mod deficits; decreased greatly from baseline (IND without AD)        Lower Body Dressing    [place] dependent (less than 25% patient effort)     Comment 2/2 increased pain and dec'd functional reach/poor ROM at this time        Grooming    Self-Performance washes, rinses and dries hands     [place] set up     Position supported sitting     Comment Completed OOB in chair, unable to tolerate standing at sink side to perform today. Limited by pain and dec'd standing tolerance.        Toileting    [place] close supervision;perform bladder hygiene     Position unsupported sitting     Comment Reqs seated position to perform 2/2 dec'd standing tolerance and dec'd standing dynamic balance        BADL Safety/Performance    Impairments,  BADL Safety/Performance endurance/activity tolerance;pain;balance     Skilled BADL Treatment/Intervention BADL process/adaptation training;adaptive equipment training     Progress in BADL Status effort and initiation        ADL Interventions    Self-Care (BADL) Promotion out of bed for BADLs encouraged   OOB in chair at end of session       AM-PAC (TM) - ADL (Current Function)    Putting on and taking off regular lower body clothing? 2 - A Lot     Bathing? 2 - A Lot     Toileting? 3 - A Little     Putting on/taking off regular upper body clothing? 3 - A Little     How much help for taking care of personal grooming? 3 - A Little     Eating meals? 4 - None     AM-PAC (TM) ADL Score 17        Assessment/Plan (OT)    Daily Outcome Statement OT eval completed, pt overall limited by increased pain and dec'd functional endurance and balance s/p L femur nailing. Pt able to ambulate functional distance to bathroom, however with increased time and effort. [place] grossly for functional txfers, with increased time and effort as well. Pt reqs use of RW given pain and dec'd balance. Ax1 for all LB ADLs, reqs seated position for grooming tasks, and completed toileting task at [place]. Pt motivated to return to IND PLOF and wants to return to [facility] apartment at [facility] level. Rec acute rehab upon DC, pt's goal is to go to [facility].     Rehab Potential good, to achieve stated therapy goals     Therapy Frequency 5-7 times/wk     Planned Therapy Interventions activity tolerance training;BADL retraining;functional balance retraining;occupation/activity based interventions;strengthening exercise;transfer/mobility retraining               OT Assessment/Plan      Most Recent Value   OT Recommended Discharge Disposition acute rehab/Inpatient Rehab Facility at 01/29/2022 1043   Anticipated Equipment Needs At Discharge (OT) other (see comments)  [TBD pending progression] at 01/29/2022 1043   Patient/Family Therapy Goal Statement To go to [facility] at 01/29/2022  1043                    Education Documentation  Rehabilitation Therapy, taught by Faye Simms OT at 1/29/2022  1:37 PM.  Learner: Patient  Readiness: Acceptance  Method: Explanation  Response: Verbalizes Understanding  Comment: OT POC, OTs role, ADLs, safety, WBAT          OT Goals      Most Recent Value   Bed Mobility Goal 1    Activity/Assistive Device bed mobility activities, all at 01/29/2022 1043   [place] modified independence at 01/29/2022 1043   Time Frame short-term goal (STG) at 01/29/2022 1043   Progress/Outcome goal ongoing at 01/29/2022 1043   Transfer Goal 1    Activity/Assistive Device all transfers at 01/29/2022 1043   [place] modified independence at 01/29/2022 1043   Time Frame 5-7 days at 01/29/2022 1043   Progress/Outcome goal ongoing at 01/29/2022 1043   Dressing Goal 1    Activity/Adaptive Equipment dressing skills, all at 01/29/2022 1043   [place] modified independence at 01/29/2022 1043   Time Frame 5-7 days at 01/29/2022 1043   Progress/Outcome goal ongoing at 01/29/2022 1043   Toileting Goal 1    Activity/Assistive Device toileting skills, all at 01/29/2022 1043   [place] modified independence at 01/29/2022 1043   Time Frame 5-7 days at 01/29/2022 1043   Progress/Outcome goal ongoing at 01/29/2022 1043   Grooming Goal 1    Activity/Assistive Device grooming skills, all at 01/29/2022 1043   [place] modified independence  [standing at sink side for >5min] at 01/29/2022 1043   Time Frame 5-7 days at 01/29/2022 1043   Progress/Outcome goal ongoing at 01/29/2022 1043

## 2022-01-29 NOTE — PLAN OF CARE
Problem: Adult Inpatient Plan of Care  Goal: Plan of Care Review  Outcome: Progressing  Flowsheets (Taken 1/29/2022 1432)  Progress: improving  Plan of Care Reviewed With: patient  Outcome Summary: [facility] w/ RW at [facility], [facility] 14/24, excellent motivation, lives alone, change from baseline indep, PT recommends acute rehab stay

## 2022-01-30 LAB
ANION GAP SERPL CALC-SCNC: 9 MEQ/L (ref 3–15)
BUN SERPL-MCNC: 29 MG/DL (ref 8–20)
CALCIUM SERPL-MCNC: 8.9 MG/DL (ref 8.9–10.3)
CHLORIDE SERPL-SCNC: 97 MEQ/L (ref 98–109)
CO2 SERPL-SCNC: 25 MEQ/L (ref 22–32)
CREAT SERPL-MCNC: 0.9 MG/DL (ref 0.6–1.1)
ERYTHROCYTE [DISTWIDTH] IN BLOOD BY AUTOMATED COUNT: 12.2 % (ref 11.7–14.4)
GFR SERPL CREATININE-BSD FRML MDRD: >60 ML/MIN/1.73M*2
GLUCOSE SERPL-MCNC: 104 MG/DL (ref 70–99)
HCT VFR BLDCO AUTO: 30.9 % (ref 35–45)
HGB BLD-MCNC: 10.3 G/DL (ref 11.8–15.7)
MCH RBC QN AUTO: 32.8 PG (ref 28–33.2)
MCHC RBC AUTO-ENTMCNC: 33.3 G/DL (ref 32.2–35.5)
MCV RBC AUTO: 98.4 FL (ref 83–98)
PDW BLD AUTO: 9.6 FL (ref 9.4–12.3)
PLATELET # BLD AUTO: 221 K/UL (ref 150–369)
POTASSIUM SERPL-SCNC: 4.4 MEQ/L (ref 3.6–5.1)
RBC # BLD AUTO: 3.14 M/UL (ref 3.93–5.22)
SODIUM SERPL-SCNC: 131 MEQ/L (ref 136–144)
WBC # BLD AUTO: 9.06 K/UL (ref 3.8–10.5)

## 2022-01-30 PROCEDURE — 97116 GAIT TRAINING THERAPY: CPT | Mod: GP

## 2022-01-30 PROCEDURE — 63700000 HC SELF-ADMINISTRABLE DRUG: Performed by: STUDENT IN AN ORGANIZED HEALTH CARE EDUCATION/TRAINING PROGRAM

## 2022-01-30 PROCEDURE — 97530 THERAPEUTIC ACTIVITIES: CPT | Mod: GP

## 2022-01-30 PROCEDURE — 85027 COMPLETE CBC AUTOMATED: CPT | Performed by: STUDENT IN AN ORGANIZED HEALTH CARE EDUCATION/TRAINING PROGRAM

## 2022-01-30 PROCEDURE — 97535 SELF CARE MNGMENT TRAINING: CPT | Mod: GO

## 2022-01-30 PROCEDURE — 63700000 HC SELF-ADMINISTRABLE DRUG: Performed by: ORTHOPAEDIC SURGERY

## 2022-01-30 PROCEDURE — 99232 SBSQ HOSP IP/OBS MODERATE 35: CPT | Performed by: HOSPITALIST

## 2022-01-30 PROCEDURE — 12000000 HC ROOM AND CARE MED/SURG

## 2022-01-30 PROCEDURE — 80048 BASIC METABOLIC PNL TOTAL CA: CPT | Performed by: STUDENT IN AN ORGANIZED HEALTH CARE EDUCATION/TRAINING PROGRAM

## 2022-01-30 PROCEDURE — 36415 COLL VENOUS BLD VENIPUNCTURE: CPT | Performed by: STUDENT IN AN ORGANIZED HEALTH CARE EDUCATION/TRAINING PROGRAM

## 2022-01-30 RX ADMIN — ACETAMINOPHEN 650 MG: 325 TABLET, FILM COATED ORAL at 18:46

## 2022-01-30 RX ADMIN — ASPIRIN 81 MG CHEWABLE TABLET 81 MG: 81 TABLET CHEWABLE at 20:43

## 2022-01-30 RX ADMIN — ACETAMINOPHEN 650 MG: 325 TABLET, FILM COATED ORAL at 10:07

## 2022-01-30 RX ADMIN — BISMUTH SUBSALICYLATE 262 MG: 262 TABLET, CHEWABLE ORAL at 20:44

## 2022-01-30 RX ADMIN — ACETAMINOPHEN 650 MG: 325 TABLET, FILM COATED ORAL at 22:14

## 2022-01-30 RX ADMIN — ASPIRIN 81 MG CHEWABLE TABLET 81 MG: 81 TABLET CHEWABLE at 10:07

## 2022-01-30 RX ADMIN — ACETAMINOPHEN 650 MG: 325 TABLET, FILM COATED ORAL at 14:37

## 2022-01-30 RX ADMIN — ACETAMINOPHEN 650 MG: 325 TABLET, FILM COATED ORAL at 05:59

## 2022-01-30 ASSESSMENT — COGNITIVE AND FUNCTIONAL STATUS - GENERAL
MOVING TO AND FROM BED TO CHAIR: 3 - A LITTLE
HELP NEEDED FOR BATHING: 3 - A LITTLE
DRESSING REGULAR UPPER BODY CLOTHING: 3 - A LITTLE
AFFECT: WFL;ANXIOUS
CLIMB 3 TO 5 STEPS WITH RAILING: 3 - A LITTLE
STANDING UP FROM CHAIR USING ARMS: 3 - A LITTLE
EATING MEALS: 4 - NONE
WALKING IN HOSPITAL ROOM: 3 - A LITTLE
HELP NEEDED FOR PERSONAL GROOMING: 3 - A LITTLE
AFFECT: WFL;ANXIOUS
DRESSING REGULAR LOWER BODY CLOTHING: 2 - A LOT
TOILETING: 3 - A LITTLE

## 2022-01-30 NOTE — NURSING NOTE
BP 96/53 HR 61, pt sitting up in chair, denies dizziness/lightheadedness. Dr. Chan notified. Orders received to administer 1L NSS IV at 80mL/hr. See MAR for documentation.

## 2022-01-30 NOTE — PROGRESS NOTES
Patient: Julisa LANDIN Post  Location:  [facility] 5PAV 5406  MRN:  [id number]  Today's date:  1/30/2022    Julisa is a 71 y.o. female admitted on 1/28/2022 with Closed left hip fracture, initial encounter (CMS/[facility]) [S72.002A]. Principal problem is Closed left hip fracture, initial encounter (CMS/[facility]).    Past Medical History  Julisa has a past medical history of Acne and Osteoporosis.    History of Present Illness  71 y.o. year old female who presented to the hospital after a fall 1/28/22 with an inability to bear weight. The patient was diagnosed with a left intertrochanteric femur fracture.  S/p L femur IMN 1/28/22. WBAT LLE     Patient was received OOB in chair. Patient was left at end of session OOB in chiar with call bell in reach and alarm on. RN aware of PT encounter.       PT Vitals    Date/Time Pulse HR Source SpO2 Pt Activity O2 Therapy BP BP Location BP Method Pt Position Observations [facility]   01/30/22 0905 67 Monitor 100 % At rest None (Room air) 121/55 Left upper arm Automatic Sitting -- DM   01/30/22 0945 75 -- 100 % At rest None (Room air) 125/59 Right upper arm Automatic Sitting PT/OT post activity NB      PT Pain    Date/Time Pain Type Side/Orientation Location Rating: Rest Rating: Activity Description Interventions [facility]   01/30/22 0905 Pain Assessment left hip 0 - no pain 2 - mild pain aching position adjusted DM          Prior Living Environment      Most Recent Value   Current Living Arrangements apartment   Living Environment Comment Normally lives alone in apartment in [facility], 0STE and elevator, tub shower. [place] in [place], can return home with family if absolutely needed, however wishes to get back to IND baseline and return to [facility]        Prior Level of Function      Most Recent Value   Ambulation independent   Transferring independent   Toileting independent   Bathing independent   Dressing independent   Eating independent   Prior Level of Function Comment IND with ADLs, IADLs,  functional mobility without AD. Is a , however not often as lives in [place]. +works full time as psychotherapist/SW.   Assistive Device Currently Used at Home none           PT Evaluation and Treatment - 01/30/22 0903        PT Time Calculation    Start Time 0903     Stop Time 0947     Time Calculation (min) 44 min        Session Details    Document Type daily treatment/progress note     Mode of Treatment physical therapy        General Information    Patient Profile Reviewed yes     Onset of Illness/Injury or Date of Surgery 01/28/22     General Observations of Patient pt received OOB in chair     Existing Precautions/Restrictions weight bearing;fall        Weight-bearing Status    Left LE Weight-Bearing Status weight-bearing as tolerated (WBAT)        Cognition/Psychosocial    Affect/Mental Status (Cognition) WFL;anxious     Orientation Status (Cognition) oriented x 4     Follows Commands (Cognition) WFL     Cognitive Function WFL     Comment, Cognition pleasant and cooperative; asked many questions regarding her next levels of care after hospital d/c        Bed Mobility    Comment (Bed Mobility) NT- pt received OOB        Transfers    Transfers car transfer        Sit to Stand Transfer    [place], Sit to Stand Transfer supervision     Verbal Cues hand placement;safety     Assistive Device walker, front-wheeled     Comment able to scoot forward in chair prior to standing; WFL timing of task; able to demo stability once standing; minimal WBing on LLE when standing despite VCs and edu to do so        Stand to Sit Transfer    [place], Stand to Sit Transfer supervision     Verbal Cues hand placement;safety     Assistive Device walker, front-wheeled     Comment fair eccentric lowering; extended LLE to sit        Car Transfer    Transfer Technique sit-stand;stand-sit     [place], Car Transfer close supervision     Verbal Cues hand placement;technique     Assistive Device walker, front-wheeled      Comment increased time to complete task; able to get LLE into/out of call with assist from UEs and with use of RLE to help with task; VCs for technqiue and use of assitance        Gait Training    [place], Gait close supervision     Assistive Device walker, front-wheeled     Distance in Feet 200 feet     Pattern (Gait) step-to     Deviations/Abnormal Patterns (Gait) base of support, narrow;step length decreased;stride length decreased;gait speed decreased     Maintains Weight-bearing Status (Gait) able to maintain     Comment (Gait/Stairs) significant improvement with ambulation this session; no LOBs and pt with intense focus on making sure to ambulate heel to toe and WB through LLE; able to steer RW and navigate obastacles in cross        Stairs Training    [place], Stairs minimum assist (75% or more patient effort)     Assistive Device railing     Handrail Location (Stairs) right side (ascending)     Number of Stairs 4     Ascending Stairs Technique step-to-step     Descending Stairs Technique step-to-step     Comment used lateral approach for ascending stairs and anterior approach to descend; significant instability with descending stairs and would benefit from continued practice for safety; [place] due to instability        Safety Issues, Functional Mobility    Impairments Affecting Function (Mobility) endurance/activity tolerance;pain     Comment, Safety Issues/Impairments (Mobility) safety concern with stairs        Balance    Static Sitting Balance WFL     Dynamic Sitting Balance mild impairment     Sit to Stand Dynamic Balance mild impairment     Static Standing Balance mild impairment     Dynamic Standing Balance mild impairment     Comment, Balance no overt LOBs noted; instability negotiating stairs        Motor Skills    Functional Endurance fair        Coping    Observed Emotional State calm;cooperative;anxious     Verbalized Emotional State acceptance     Trust Relationship/Rapport care  explained;questions answered        AM-PAC (TM) - Mobility (Current Function)    Turning from your back to your side while in a flat bed without using bedrails? 3 - A Little     Moving from lying on your back to sitting on the side of a flat bed without using bedrails? 3 - A Little     Moving to and from a bed to a chair? 3 - A Little     Standing up from a chair using your arms? 3 - A Little     To walk in a hospital room? 3 - A Little     Climbing 3-5 steps with a railing? 3 - A Little     AM-PAC (TM) Mobility Score 18        Assessment/Plan (PT)    Daily Outcome Statement pt overall supervision for transfers and ambulation using RW. Pt closeS for car trasfer and [place] for stairs. She demo'd significant improvement this session and would no longer be a candidate for acute rehab. Pt would most benefit from returning home with home PT (pt agreeable). Pt prefers to go to her home in [facility] where she has 0STE and elevator access to her 1 floor apt. If pt needs to stay with famly at d/c she will need to negotiate stairs, which she states assist can be provided by her family adequately. Recent CM note already indicates updated dispo rec from rehab ->home with assist/HHS. PT will continue to follow pt to work towards STGs.     Rehab Potential good, to achieve stated therapy goals     Therapy Frequency 5-7 times/wk     Planned Therapy Interventions bed mobility training;balance training;gait training;transfer training;strengthening;stair training               PT Assessment/Plan      Most Recent Value   PT Recommended Discharge Disposition home with assistance, home with home health at 01/30/2022 0903   Anticipated Equipment Needs at Discharge (PT) walker, front-wheeled at 01/30/2022 0903   Patient/Family Therapy Goals Statement go to [facility] at 01/29/2022 1044                    Education Documentation  Safety, taught by Lyn Welch, PT at 1/30/2022  4:49 PM.  Learner: Patient  Readiness: Acceptance  Method:  Explanation  Response: Verbalizes Understanding  Comment: pt edu provided on call bell, need for assistance, role of PT, importance of OOB mobility, updated dispo rec, proper use of RW, WBAT LLE    Home Safety, taught by Lyn Welch PT at 1/30/2022  4:49 PM.  Learner: Patient  Readiness: Acceptance  Method: Explanation  Response: Verbalizes Understanding  Comment: pt edu provided on call bell, need for assistance, role of PT, importance of OOB mobility, updated dispo rec, proper use of RW, WBAT LLE    Energy Conservation, taught by Lyn Welch PT at 1/30/2022  4:49 PM.  Learner: Patient  Readiness: Acceptance  Method: Explanation  Response: Verbalizes Understanding  Comment: pt edu provided on call bell, need for assistance, role of PT, importance of OOB mobility, updated dispo rec, proper use of RW, WBAT LLE          PT Goals      Most Recent Value   Bed Mobility Goal 1    Activity/Assistive Device bed mobility activities, all at 01/29/2022 1044   [place] independent at 01/29/2022 1044   Time Frame 5-7 days at 01/29/2022 1044   Progress/Outcome goal ongoing at 01/29/2022 1044   Transfer Goal 1    Activity/Assistive Device all transfers, walker, front-wheeled at 01/29/2022 1044   [place] modified independence at 01/29/2022 1044   Time Frame 5-7 days at 01/29/2022 1044   Progress/Outcome goal ongoing at 01/29/2022 1044   Gait Training Goal 1    Activity/Assistive Device gait (walking locomotion), walker, front-wheeled at 01/29/2022 1044   [place] modified independence at 01/29/2022 1044   Distance 200 ft at 01/29/2022 1044   Time Frame 5-7 days at 01/29/2022 1044   Progress/Outcome goal ongoing at 01/29/2022 1044   Stairs Goal 1    Activity/Assistive Device ascending stairs, descending stairs, cane, straight at 01/29/2022 1044   [place] modified independence at 01/29/2022 1044   Number of Stairs 12 at 01/29/2022 1044   Time Frame 5-7 days at 01/29/2022 1044   Progress/Outcome goal  ongoing at 01/29/2022 1044

## 2022-01-30 NOTE — PROGRESS NOTES
"    Hospital Medicine Service -  Daily Progress Note       SUBJECTIVE   Interval History: Feels that she is doing better.  She was able to work more with physical therapy today.  She denies any chest pains, shortness of breath, lightheadedness, or dizziness.  No nausea or vomiting.     OBJECTIVE      Vital signs in last 24 hours:  Temp:  [36.8 °C (98.2 °F)-36.9 °C (98.4 °F)] 36.8 °C (98.2 °F)  Heart Rate:  [54-67] 67  Resp:  [16] 16  BP: ()/(50-55) 121/55    Intake/Output Summary (Last 24 hours) at 1/30/2022 1153  Last data filed at 1/29/2022 1859  Gross per 24 hour   Intake 214.67 ml   Output 300 ml   Net -85.33 ml       PHYSICAL EXAMINATION      Physical Exam    Visit Vitals  BP (!) 121/55 (BP Location: Left upper arm, Patient Position: Sitting)   Pulse 67   Temp 36.8 °C (98.2 °F) (Oral)   Resp 16   Ht 1.66 m (5' 5.35\")   Wt 52.6 kg (115 lb 15.4 oz)   SpO2 100%   BMI 19.09 kg/m²       Physical Exam  General Appearance:        Awake, Alert, Oriented x3   Head:    Normocephalic   Eyes:    No icterus   Respiratory:     Clear to auscultation bilaterally   Cardiovascular   Regular rate and rhythm   GI:     Soft, non-tender, bowel sounds active    Psychiatric   Appropriate, cooperative      LINES, CATHETERS, DRAINS, AIRWAYS, AND WOUNDS   Lines, Drains, and Airways:  Wounds (agree with documentation and present on admission):  Peripheral IV (Adult) 01/29/22 Anterior;Right Forearm (Active)   Number of days: 1       Surgical Incision Hip Anterior;Left;Proximal (Active)   Number of days: 2       Surgical Incision Hip Left (Active)   Number of days: 2         Comments:      LABS / IMAGING / TELE      Labs  Results from last 7 days   Lab Units 01/30/22  0458   WBC K/uL 9.06   HEMOGLOBIN g/dL 10.3*   HEMATOCRIT % 30.9*   PLATELETS K/uL 221     Results from last 7 days   Lab Units 01/30/22  0458   SODIUM mEQ/L 131*   POTASSIUM mEQ/L 4.4   CHLORIDE mEQ/L 97*   CO2 mEQ/L 25   BUN mg/dL 29*   CREATININE mg/dL 0.9   GLUCOSE " mg/dL 104*   CALCIUM mg/dL 8.9         SARS-CoV-2 (COVID-19) (no units)   Date/Time Value   01/28/2022 1128 Negative       ASSESSMENT AND PLAN      * Closed left hip fracture, initial encounter (CMS/[facility])  Assessment & Plan  71 yr old female with no significant past medical history presenting with mechanical fall leading to left hip fracture  S/p Left Femur Cephalomedullary Nail on 1/28/22  Continue PT/OT-feels that she is doing well with physical therapy.  Encourage IS  Continue Bowel regimen  DVT prophylaxis and pain meds per ortho    Other acne  Assessment & Plan  Hold aldactone for now    Leucocytosis  Assessment & Plan  WBC remains improved  Afebrile.  Chest x-ray not suggestive of pneumonia    Bilateral ovarian cysts  Assessment & Plan  Recommend outpatient follow up with gyn    Bilateral carotid bruits  Assessment & Plan  Patient denies any history of stroke or tia  outpatient f/u for carotid ultrasound            Reggie Chan, DO  1/30/2022

## 2022-01-30 NOTE — PROGRESS NOTES
Orthopaedic Surgery Progress Note     Interval History:   Julisa Tyler (71 y.o. female) is resting comfortably in bed with no acute events overnight. Up and out of bed yesterday - would like to try stairs today.    Vital Signs:   Vitals:    01/29/22 2200   BP: (!) 104/53   Pulse: (!) 54   Resp: 16   Temp: 36.8 °C (98.3 °F)   SpO2: 97%       Labs:   CBC   CBC Results       01/29/22 01/28/22     0415 1117    WBC 8.46 12.52    RBC 3.00 3.82    HGB 10.0 12.7    HCT 29.5 37.8    MCV 98.3 99.0    MCH 33.3 33.2    MCHC 33.9 33.6     302         Comment for HGB at 0415 on 01/29/22: RESULT CHECKED          Physical Exam:   General:   No acute distress   Symmetric chest rise bilaterally with normal effort     Musculoskeletal:   Left Lower Extremity   Inspection: Surgical dressing is clean, dry, and intact.  Motor: Fires TA/EHL/GS   Sensory: Intact to light touch in SPN/DPN/tibial/saphenous/sural distributions   Vascular: Palpable DP pulse.     Assessment and Plan  Julisa Tyler is a 71 y.o. female who is s/p L femur IMN    -- POD: 2 Days Post-Op   -- WBS: WBAT   -- DVT PPX: ASA   -- PT OT: recs appreciated  -- [facility] recs appreciated  -- rehab consult pending    Length of stay: 2      Kalyn Green MD

## 2022-01-30 NOTE — PROGRESS NOTES
Patient:  Julisa LANDIN Post  Location:  [facility] 5PAV [id number]  MRN:  [id number]  Today's date:  1/30/2022    RN cleared pt for therapy prior to session. Pt left in recliner with alarm set, call bell and personal items accessible, all questions answered and all needs addressed.  RN notified of position and performance    Julisa is a 71 y.o. female admitted on 1/28/2022 with Closed left hip fracture, initial encounter (CMS/[facility]) [S72.002A]. Principal problem is Closed left hip fracture, initial encounter (CMS/[facility]).    Past Medical History  Julisa has a past medical history of Acne and Osteoporosis.    History of Present Illness  71 y.o. year old female who presented to the hospital after a fall 1/28/22 with an inability to bear weight. The patient was diagnosed with a left intertrochanteric femur fracture.  S/p L femur IMN 1/28/22. WBAT LLE       OT Vitals    Date/Time Pulse HR Source SpO2 Pt Activity O2 Therapy BP BP Location BP Method Pt Position Observations [facility]   01/30/22 0905 67 Monitor 100 % At rest None (Room air) 121/55 Left upper arm Automatic Sitting -- DM   01/30/22 0945 75 -- 100 % At rest None (Room air) 125/59 Right upper arm Automatic Sitting PT/OT post activity NB      OT Pain    Date/Time Pain Type Side/Orientation Location Rating: Rest Rating: Activity Description Interventions [facility]   01/30/22 0905 Pain Assessment left hip 0 - no pain 2 - mild pain aching position adjusted DM          Prior Living Environment      Most Recent Value   Current Living Arrangements apartment   Living Environment Comment Normally lives alone in apartment in [facility], 0STE and elevator, tub shower. Is in [place] visiting family, can return home with family if absolutely needed, however wishes to get back to [facility] baseline and return to [facility]        Prior Level of Function      Most Recent Value   Ambulation independent   Transferring independent   Toileting independent   Bathing independent   Dressing independent   Eating independent    Prior Level of Function Comment IND with ADLs, IADLs, functional mobility without AD. Is a , however not often as lives in [place]. +works full time as psychotherapist/SW.   Assistive Device Currently Used at Home none        Occupational Profile      Most Recent Value   Reason for Services/Referral L femur IMN,  ADL intervention/dispo needs   Successful Occupations Works full time as a psychotherapist/SW, Enjoys traveling   Performance Patterns Enjoys exercising daily on [place]   Environmental Supports and Barriers Supportive family   Patient Goals To go to [facility]           OT Evaluation and Treatment - 01/30/22 0902        OT Time Calculation    Start Time 0902     Stop Time 0946     Time Calculation (min) 44 min        Session Details    Document Type daily treatment/progress note     Mode of Treatment occupational therapy        General Information    Patient Profile Reviewed yes     Onset of Illness/Injury or Date of Surgery 01/28/22     General Observations of Patient Pt rec'd in recliner agreeable to therapy     Existing Precautions/Restrictions weight bearing;fall        Weight-bearing Status    Left LE Weight-Bearing Status weight-bearing as tolerated (WBAT)        Cognition/Psychosocial    Affect/Mental Status (Cognition) WFL;anxious     Orientation Status (Cognition) oriented x 4     Follows Commands (Cognition) WFL     Cognitive Function WFL     Comment, Cognition Very pleasant and cooperative, anxious at times. Thoughtful with questions regarding discharge planning/activity restrictions        Bed Mobility    Comment (Bed Mobility) Rec'd OOB        Transfers    Transfers toilet transfer;tub transfer     Maintains Weight-Bearing Status (Transfers) able to maintain        Sit to Stand Transfer    Wilcox, Sit to Stand Transfer supervision     Assistive Device walker, front-wheeled     Comment Able to perform without physical assist or vc for technique        Stand to Sit Transfer    Wilcox,  Stand to Sit Transfer supervision;verbal cues     Verbal Cues hand placement;safety;technique     Assistive Device walker, front-wheeled     Comment Fair controlled descent with BUE        Toilet Transfer    Transfer Technique stand-sit;sit-stand     Brooks, Toilet Transfer close supervision     Verbal Cues hand placement;technique     Assistive Device grab bars/safety frame;walker, front-wheeled     Comment Good recall of technique; no physical assist req for either transition        Tub Transfer    Comment Discussion only - educated on transfer technique with use of tub bench. Pt receptive        Safety Issues, Functional Mobility    Impairments Affecting Function (Mobility) pain;range of motion (ROM);strength;endurance/activity tolerance;balance        Balance    Static Sitting Balance WFL     Dynamic Sitting Balance mild impairment     Sit to Stand Dynamic Balance mild impairment     Static Standing Balance mild impairment     Dynamic Standing Balance mild impairment     Comment, Balance Supervision with RW, no overt LOB observed        Motor Skills    Functional Endurance Fair+        Grooming    Self-Performance washes, rinses and dries hands     Brooks supervision     Position sink side        Toileting    Brooks supervision;adjust/manage clothing;perform bladder hygiene     Position supported sitting;supported standing        AM-PAC (TM) - ADL (Current Function)    Putting on and taking off regular lower body clothing? 2 - A Lot     Bathing? 3 - A Little     Toileting? 3 - A Little     Putting on/taking off regular upper body clothing? 3 - A Little     How much help for taking care of personal grooming? 3 - A Little     Eating meals? 4 - None     AM-PAC (TM) ADL Score 18        Assessment/Plan (OT)    Daily Outcome Statement OT follow-up tx complete. Pt is making good progress toward functional goals, demo supervision level with functional transfers and ADLs including toileting and grooming  tasks. She will cont to benefit from therapy to maximize independence, anticipate home with assist/HHOT at d/c               OT Assessment/Plan      Most Recent Value   OT Recommended Discharge Disposition home with assistance, home with home health at 01/30/2022 0902   Anticipated Equipment Needs At Discharge (OT) other (see comments)  [TBD pending progression] at 01/29/2022 1043   Patient/Family Therapy Goal Statement To go to [facility] at 01/29/2022 1043                    Education Documentation  No documentation found.        OT Goals      Most Recent Value   Bed Mobility Goal 1    Activity/Assistive Device bed mobility activities, all at 01/29/2022 1043   Bertie modified independence at 01/29/2022 1043   Time Frame short-term goal (STG) at 01/29/2022 1043   Progress/Outcome goal ongoing at 01/29/2022 1043   Transfer Goal 1    Activity/Assistive Device all transfers at 01/29/2022 1043   Bertie modified independence at 01/29/2022 1043   Time Frame 5-7 days at 01/29/2022 1043   Progress/Outcome goal ongoing at 01/29/2022 1043   Dressing Goal 1    Activity/Adaptive Equipment dressing skills, all at 01/29/2022 1043   Bertie modified independence at 01/29/2022 1043   Time Frame 5-7 days at 01/29/2022 1043   Progress/Outcome goal ongoing at 01/29/2022 1043   Toileting Goal 1    Activity/Assistive Device toileting skills, all at 01/29/2022 1043   Bertie modified independence at 01/29/2022 1043   Time Frame 5-7 days at 01/29/2022 1043   Progress/Outcome goal ongoing at 01/29/2022 1043   Grooming Goal 1    Activity/Assistive Device grooming skills, all at 01/29/2022 1043   Bertie modified independence  [standing at sink side for >5min] at 01/29/2022 1043   Time Frame 5-7 days at 01/29/2022 1043   Progress/Outcome goal ongoing at 01/29/2022 1043

## 2022-01-30 NOTE — PLAN OF CARE
Problem: Adult Inpatient Plan of Care  Goal: Plan of Care Review  Outcome: Progressing  Flowsheets  Taken 1/30/2022 1304 by Zoe Parada LCSW  Plan of Care Reviewed With: patient  Taken 1/29/2022 1432 by Willy Mims PT  Progress: improving  Goal: Readiness for Transition of Care  Outcome: Progressing  Intervention: Mutually Develop Transition Plan  Flowsheets (Taken 1/30/2022 1304)  Anticipated Discharge Disposition:   home with assistance   home with home health  Equipment Needed After Discharge:   shower chair   walker, front-wheeled  Assistive Device/Animal Currently Used at Home: none  Outpatient/Agency/Support Group Needs: homecare agency  Current Discharge Risk: lives alone  Readmission Within the Last 30 Days: no previous admission in last 30 days  Patient/Family Anticipated Services at Transition:   home health care   rehabilitation services  Patient/Family Anticipates Transition to: home with help/services  Transportation Anticipated: (sister vs Lyft, maybe sydnie-in-law)   family or friend will provide   other (see comments)  Concerns to be Addressed:   care coordination/care conferences   discharge planning  Type of Skilled Nursing Care Services:   PT   OT     Chart reviewed. Pt s/p L hip fx fixation. PT/OT initially rec [facility] yesterday with PMR consult pending but per RN, today pt is cleared for home. SW met with pt at bedside to introduce self/care coordination role. Pt resides alone in [facility], [address] [place] apartment, 0STE. Pt to be staying with sydnie-in-law locally for several days prior to returning to [facility], pts bed/full bath on 3rd [place] and she plans to stay there until she feels comfortable going down steps; stated her 2 grandsons will bring her meals. Pt works as LCSW/psychotherapist in private practice. Pt independent at baseline with ambulation/ADLs. Denied DME. Pt ordering shower chair and PT issuing RW. Denied h/o SNF/[facility]/HHC. H/o outpatient PT PRN. Requested home PT because it would be  difficult to navigate subway to her outpatient PT office. No preferences, agreeable to referrals being sent out. Confirmed local pharmacy. PCP is Kd Zambrano MD ([facility]). Pt reports her sister or Ly[employer] will likely transport her to josh-in-law's home. Care Coordination will continue to follow.     Anticipated Disposition: Josh-in-law's home in [place], then home to [facility] with [facility]/Home PT - referrals pending  [address]nd [place] apartment, 0STE; independent at baseline  Pt ordering shower chair; PT issuing RW  Family vs Lyft transport

## 2022-01-31 VITALS
HEART RATE: 61 BPM | TEMPERATURE: 97.6 F | HEIGHT: 65 IN | RESPIRATION RATE: 16 BRPM | SYSTOLIC BLOOD PRESSURE: 101 MMHG | WEIGHT: 115.96 LBS | BODY MASS INDEX: 19.32 KG/M2 | DIASTOLIC BLOOD PRESSURE: 54 MMHG | OXYGEN SATURATION: 98 %

## 2022-01-31 LAB
ANION GAP SERPL CALC-SCNC: 8 MEQ/L (ref 3–15)
ATRIAL RATE: 71
BUN SERPL-MCNC: 20 MG/DL (ref 8–20)
CALCIUM SERPL-MCNC: 8.7 MG/DL (ref 8.9–10.3)
CHLORIDE SERPL-SCNC: 100 MEQ/L (ref 98–109)
CO2 SERPL-SCNC: 26 MEQ/L (ref 22–32)
CREAT SERPL-MCNC: 0.9 MG/DL (ref 0.6–1.1)
ERYTHROCYTE [DISTWIDTH] IN BLOOD BY AUTOMATED COUNT: 12.3 % (ref 11.7–14.4)
GFR SERPL CREATININE-BSD FRML MDRD: >60 ML/MIN/1.73M*2
GLUCOSE SERPL-MCNC: 94 MG/DL (ref 70–99)
HCT VFR BLDCO AUTO: 32.2 % (ref 35–45)
HGB BLD-MCNC: 10.9 G/DL (ref 11.8–15.7)
MCH RBC QN AUTO: 33.2 PG (ref 28–33.2)
MCHC RBC AUTO-ENTMCNC: 33.9 G/DL (ref 32.2–35.5)
MCV RBC AUTO: 98.2 FL (ref 83–98)
P AXIS: 85
PDW BLD AUTO: 9.8 FL (ref 9.4–12.3)
PLATELET # BLD AUTO: 239 K/UL (ref 150–369)
POTASSIUM SERPL-SCNC: 4.3 MEQ/L (ref 3.6–5.1)
PR INTERVAL: 156
QRS DURATION: 58
QT INTERVAL: 378
QTC CALCULATION(BAZETT): 410
R AXIS: 50
RBC # BLD AUTO: 3.28 M/UL (ref 3.93–5.22)
SODIUM SERPL-SCNC: 134 MEQ/L (ref 136–144)
T WAVE AXIS: 64
VENTRICULAR RATE: 71
WBC # BLD AUTO: 7.37 K/UL (ref 3.8–10.5)

## 2022-01-31 PROCEDURE — 80048 BASIC METABOLIC PNL TOTAL CA: CPT | Performed by: STUDENT IN AN ORGANIZED HEALTH CARE EDUCATION/TRAINING PROGRAM

## 2022-01-31 PROCEDURE — 36415 COLL VENOUS BLD VENIPUNCTURE: CPT | Performed by: STUDENT IN AN ORGANIZED HEALTH CARE EDUCATION/TRAINING PROGRAM

## 2022-01-31 PROCEDURE — 97535 SELF CARE MNGMENT TRAINING: CPT | Mod: GO

## 2022-01-31 PROCEDURE — 85027 COMPLETE CBC AUTOMATED: CPT | Performed by: STUDENT IN AN ORGANIZED HEALTH CARE EDUCATION/TRAINING PROGRAM

## 2022-01-31 PROCEDURE — 97530 THERAPEUTIC ACTIVITIES: CPT | Mod: GP

## 2022-01-31 PROCEDURE — 63700000 HC SELF-ADMINISTRABLE DRUG: Performed by: STUDENT IN AN ORGANIZED HEALTH CARE EDUCATION/TRAINING PROGRAM

## 2022-01-31 PROCEDURE — 97116 GAIT TRAINING THERAPY: CPT | Mod: GP

## 2022-01-31 PROCEDURE — 99232 SBSQ HOSP IP/OBS MODERATE 35: CPT | Performed by: HOSPITALIST

## 2022-01-31 RX ORDER — POLYETHYLENE GLYCOL 3350 17 G/17G
17 POWDER, FOR SOLUTION ORAL DAILY PRN
Refills: 0
Start: 2022-01-31 | End: 2022-03-02

## 2022-01-31 RX ORDER — AMOXICILLIN 250 MG
1 CAPSULE ORAL 2 TIMES DAILY
Qty: 60 TABLET | Refills: 0 | Status: SHIPPED | OUTPATIENT
Start: 2022-01-31 | End: 2022-03-02

## 2022-01-31 RX ORDER — OXYCODONE HYDROCHLORIDE 5 MG/1
5-10 TABLET ORAL EVERY 4 HOURS PRN
Qty: 30 TABLET | Refills: 0 | Status: SHIPPED | OUTPATIENT
Start: 2022-01-31 | End: 2022-02-05

## 2022-01-31 RX ORDER — ACETAMINOPHEN 325 MG/1
650 TABLET ORAL EVERY 6 HOURS PRN
Start: 2022-01-31 | End: 2022-03-02

## 2022-01-31 RX ORDER — NAPROXEN SODIUM 220 MG/1
81 TABLET, FILM COATED ORAL 2 TIMES DAILY
Qty: 84 TABLET | Refills: 0 | Status: SHIPPED | OUTPATIENT
Start: 2022-01-31 | End: 2022-03-14

## 2022-01-31 RX ADMIN — ASPIRIN 81 MG CHEWABLE TABLET 81 MG: 81 TABLET CHEWABLE at 08:42

## 2022-01-31 RX ADMIN — ACETAMINOPHEN 650 MG: 325 TABLET, FILM COATED ORAL at 11:12

## 2022-01-31 RX ADMIN — ACETAMINOPHEN 650 MG: 325 TABLET, FILM COATED ORAL at 05:46

## 2022-01-31 ASSESSMENT — COGNITIVE AND FUNCTIONAL STATUS - GENERAL
DRESSING REGULAR UPPER BODY CLOTHING: 4 - NONE
AFFECT: WFL;ANXIOUS
CLIMB 3 TO 5 STEPS WITH RAILING: 3 - A LITTLE
TOILETING: 4 - NONE
WALKING IN HOSPITAL ROOM: 4 - NONE
HELP NEEDED FOR BATHING: 3 - A LITTLE
MOVING TO AND FROM BED TO CHAIR: 4 - NONE
AFFECT: WFL;ANXIOUS
DRESSING REGULAR LOWER BODY CLOTHING: 4 - NONE
HELP NEEDED FOR PERSONAL GROOMING: 4 - NONE
EATING MEALS: 4 - NONE
STANDING UP FROM CHAIR USING ARMS: 4 - NONE

## 2022-01-31 NOTE — PROGRESS NOTES
Patient: Julisa LANDIN Post  Location:  [facility] 5PAV 5406  MRN:  [id number]  Today's date:  1/31/2022       Start of session: Pt was received supine in bed. Patient medically appropriate for participation in therapy by RN    End of session:    Patient positioned in bedside recliner with call bell in reach and chair alarm on  Pt did not report or present with any cardiac or neuro symptoms. RN aware of pt’s performance during therapy      Julisa is a 71 y.o. female admitted on 1/28/2022 with Closed left hip fracture, initial encounter (CMS/[facility]) [S72.002A]. Principal problem is Closed left hip fracture, initial encounter (CMS/[facility]).    Past Medical History  Julisa has a past medical history of Acne and Osteoporosis.    History of Present Illness  71 y.o. year old female who presented to the hospital after a fall 1/28/22 with an inability to bear weight. The patient was diagnosed with a left intertrochanteric femur fracture.  S/p L femur IMN 1/28/22. WBAT LLE       PT Vitals    Date/Time Pulse SpO2 Pt Activity O2 Therapy [facility]   01/31/22 0810 66 98 % At rest None (Room air) LA      PT Pain    Date/Time Pain Type Side/Orientation Location Rating: Rest Rating: Activity Interventions [facility]   01/31/22 0810 Pain Assessment left hip 2 - mild pain 2 - mild pain position adjusted LA          Prior Living Environment      Most Recent Value   Current Living Arrangements apartment   Living Environment Comment Normally lives alone in apartment in [facility], 0STE and elevator, tub shower. Is in [place] visiting family, can return home with family if absolutely needed, however wishes to get back to IND baseline and return to [facility]        Prior Level of Function      Most Recent Value   Ambulation independent   Transferring independent   Toileting independent   Bathing independent   Dressing independent   Eating independent   Prior Level of Function Comment IND with ADLs, IADLs, functional mobility without AD. Is a , however not often as  lives in [place]. +works full time as psychotherapist/SW.   Assistive Device Currently Used at Home none           PT Evaluation and Treatment - 01/31/22 0810        PT Time Calculation    Start Time 0810     Stop Time 0836     Time Calculation (min) 26 min        Session Details    Document Type daily treatment/progress note     Mode of Treatment physical therapy        General Information    Patient Profile Reviewed yes     Onset of Illness/Injury or Date of Surgery 01/28/22     Referring Physician Dr Courtney     Patient/Family/Caregiver Comments/Observations RN cleared pt for therapy     General Observations of Patient Received supine in bed awake and alert     Existing Precautions/Restrictions fall;weight bearing        Weight-bearing Status    Left LE Weight-Bearing Status weight-bearing as tolerated (WBAT)        Cognition/Psychosocial    Affect/Mental Status (Cognition) WFL;anxious     Orientation Status (Cognition) oriented x 3     Follows Commands (Cognition) WFL     Cognitive Function WFL        Bed Mobility    Bed Mobility supine to sit to supine     [place], Supine to Sit modified independence     Assistive Device head of bed elevated     Comment (Bed Mobility) exit bed to L, no assist needed good sitting balance once at EOB        Transfers    Transfers car transfer     Maintains Weight-Bearing Status (Transfers) able to maintain        Sit to Stand Transfer    [place], Sit to Stand Transfer modified independence     Assistive Device walker, front-wheeled     Comment steady upon first standing with an assistive device, denies lightheadedness upon standing        Stand to Sit Transfer    [place], Stand to Sit Transfer modified independence     Assistive Device walker, front-wheeled     Comment returned to bedside recliner good overall eccentric control        Car Transfer    Transfer Technique sit-stand;stand-sit     [place], Car Transfer modified independence     Assistive Device  walker, front-wheeled     Comment car height simulated to that for pt's DC home, no assist needed to complete transfer, good technique        Gait Training    [place], Gait modified independence     Assistive Device walker, front-wheeled     Distance in Feet 200 feet     Pattern (Gait) step-to     Comment (Gait/Stairs) steady step to gait pattern, no overt LOB, pain controlled, denies dizziness        Stairs Training    [place], Stairs supervision     Assistive Device railing;cane, straight     Handrail Location (Stairs) left side (ascending);left side (descending)     Number of Stairs 4     Stair Height 6 inches     Ascending Stairs Technique step-to-step     Descending Stairs Technique step-to-step     Maintains Weight-bearing Status (Stairs) able to maintain     Comment pt able to complete step to technique using SPC on R side and and HR on L, no assist needed, clears each step well without dificulty        Balance    Balance Assessment sitting static balance;sit to stand dynamic balance;standing static balance;standing dynamic balance     Static Sitting Balance WFL;supported;sitting, edge of bed     Sit to Stand Dynamic Balance WFL;supported     Static Standing Balance WFL;supported     Dynamic Standing Balance WFL;supported     Comment, Balance mod independent for overall mobility, no overt LOB, good std tolerance        Motor Skills    Motor Skills functional endurance     Functional Endurance pt now ambulating 200ft using a RW for increased stability, no balance deficits, safe to return home        AM-PAC (TM) - Mobility (Current Function)    Turning from your back to your side while in a flat bed without using bedrails? 4 - None     Moving from lying on your back to sitting on the side of a flat bed without using bedrails? 4 - None     Moving to and from a bed to a chair? 4 - None     Standing up from a chair using your arms? 4 - None     To walk in a hospital room? 4 - None     Climbing 3-5 steps  with a railing? 3 - A Little     AM-PAC (TM) Mobility Score 23        Assessment/Plan (PT)    Daily Outcome Statement 1/31/22 Pt seen for follow up PT session;The patient was educated on the role of PT in the acute care setting. The patient was instructed on proper technique when performing OOB transfers to ensure their safety. Gait training was performed using the necessary AD along with instructing the patient regarding the proper set up and use of the device. All questions and concerns addressed and answered during session. Pt tolerated session Jan can complete functional tasks including performing transfers from low surface using RW without assist, ambulating 200ft using a RW and negotiate steps without difficulty; the pt demonstrates good safety awareness and receptive to PT education; she was provided education on establishing a walking program when home to progress mobility- pt verbalized understanding; no further skilled PT needs identified; pt plans to return home at [place] and home PT               PT Assessment/Plan      Most Recent Value   PT Recommended Discharge Disposition home with assistance, home with home health at 01/31/2022 0810   Anticipated Equipment Needs at Discharge (PT) walker, front-wheeled  [issued] at 01/31/2022 0810   Patient/Family Therapy Goals Statement go to [facility] at 01/29/2022 1044                    Education Documentation  Unresolved/Worsening Symptoms, taught by Dejah Reynoso, PT at 1/31/2022 10:41 AM.  Learner: Patient  Readiness: Acceptance  Method: Demonstration, Explanation  Response: Verbalizes Understanding, Demonstrated Understanding  Comment: Reviewed with pt the role of PT in acute care, importance of mobility, safety with mobility, proper transfer technique, gait training using an AD,    Joint Mobility/Strength, taught by Dejah Reynoso, PT at 1/31/2022 10:41 AM.  Learner: Patient  Readiness: Acceptance  Method: Demonstration, Explanation  Response: Verbalizes  Understanding, Demonstrated Understanding  Comment: Reviewed with pt the role of PT in acute care, importance of mobility, safety with mobility, proper transfer technique, gait training using an AD,    Home Safety, taught by Dejah Reynoso PT at 1/31/2022 10:41 AM.  Learner: Patient  Readiness: Acceptance  Method: Demonstration, Explanation  Response: Verbalizes Understanding, Demonstrated Understanding  Comment: Reviewed with pt the role of PT in acute care, importance of mobility, safety with mobility, proper transfer technique, gait training using an AD,    Energy Conservation, taught by Dejah Reynoso PT at 1/31/2022 10:41 AM.  Learner: Patient  Readiness: Acceptance  Method: Demonstration, Explanation  Response: Verbalizes Understanding, Demonstrated Understanding  Comment: Reviewed with pt the role of PT in acute care, importance of mobility, safety with mobility, proper transfer technique, gait training using an AD,    Assistive/Adaptive Devices, taught by Dejah Reynoso PT at 1/31/2022 10:41 AM.  Learner: Patient  Readiness: Acceptance  Method: Demonstration, Explanation  Response: Verbalizes Understanding, Demonstrated Understanding  Comment: Reviewed with pt the role of PT in acute care, importance of mobility, safety with mobility, proper transfer technique, gait training using an AD,    Signs/Symptoms, taught by Dejah Reynoso PT at 1/31/2022 10:41 AM.  Learner: Patient  Readiness: Acceptance  Method: Demonstration, Explanation  Response: Verbalizes Understanding, Demonstrated Understanding  Comment: Reviewed with pt the role of PT in acute care, importance of mobility, safety with mobility, proper transfer technique, gait training using an AD,    Risk Factors, taught by Dejah Reynoso PT at 1/31/2022 10:41 AM.  Learner: Patient  Readiness: Acceptance  Method: Demonstration, Explanation  Response: Verbalizes Understanding, Demonstrated Understanding  Comment: Reviewed with pt the role  of PT in acute care, importance of mobility, safety with mobility, proper transfer technique, gait training using an AD,          PT Goals      Most Recent Value   Bed Mobility Goal 1    Activity/Assistive Device bed mobility activities, all at 01/29/2022 1044   [place] independent at 01/29/2022 1044   Time Frame 5-7 days at 01/29/2022 1044   Progress/Outcome goal ongoing at 01/29/2022 1044   Transfer Goal 1    Activity/Assistive Device all transfers, walker, front-wheeled at 01/29/2022 1044   [place] modified independence at 01/29/2022 1044   Time Frame 5-7 days at 01/29/2022 1044   Progress/Outcome goal met at 01/31/2022 0810   Gait Training Goal 1    Activity/Assistive Device gait (walking locomotion), walker, front-wheeled at 01/29/2022 1044   [place] modified independence at 01/29/2022 1044   Distance 200 ft at 01/29/2022 1044   Time Frame 5-7 days at 01/29/2022 1044   Progress/Outcome goal met at 01/31/2022 0810   Stairs Goal 1    Activity/Assistive Device ascending stairs, descending stairs, cane, straight at 01/29/2022 1044   [place] modified independence at 01/29/2022 1044   Number of Stairs 12 at 01/29/2022 1044   Time Frame 5-7 days at 01/29/2022 1044   Progress/Outcome goal partially met at 01/31/2022 0810

## 2022-01-31 NOTE — PROGRESS NOTES
Orthopaedic Surgery Progress Note     Interval History:   Julisa Tyler (71 y.o. female) doing well this morning. No acute events overnight, pain is well controlled. A little sore from PT yesterday.    Vital Signs:   Vitals:    01/30/22 2148   BP: (!) 120/57   Pulse: 68   Resp: 16   Temp: 37 °C (98.6 °F)   SpO2: 100%       Labs:   CBC   CBC Results       01/29/22 01/28/22     0415 1117    WBC 8.46 12.52    RBC 3.00 3.82    HGB 10.0 12.7    HCT 29.5 37.8    MCV 98.3 99.0    MCH 33.3 33.2    MCHC 33.9 33.6     302         Comment for HGB at 0415 on 01/29/22: RESULT CHECKED          Physical Exam:   General:   No acute distress   Symmetric chest rise bilaterally with normal effort     Musculoskeletal:   Left Lower Extremity   Inspection: Surgical dressing is clean, dry, and intact.  Motor: Fires TA/EHL/GS   Sensory: Intact to light touch in SPN/DPN/tibial/saphenous/sural distributions   Vascular: Palpable DP pulse.     Assessment and Plan  Julisa Tyler is a 71 y.o. female who is s/p L femur IMN    -- POD: 3 Days Post-Op   -- WBS: WBAT   -- DVT PPX: ASA   -- PT OT: recs appreciated  -- [facility] recs appreciated    Length of stay: 3      Ralph Low MD

## 2022-01-31 NOTE — PROGRESS NOTES
"    Hospital Medicine Service -  Daily Progress Note       SUBJECTIVE   Interval History: Feels like she is doing pretty well.  Feels ready to go home.  Denies chest pains, shortness breath, nausea, vomiting, lightheadedness, or dizziness.  Eating okay.     OBJECTIVE      Vital signs in last 24 hours:  Temp:  [36.4 °C (97.6 °F)-37 °C (98.6 °F)] 36.4 °C (97.6 °F)  Heart Rate:  [64-68] 66  Resp:  [16] 16  BP: (112-120)/(57-59) 116/59    Intake/Output Summary (Last 24 hours) at 1/31/2022 1144  Last data filed at 1/30/2022 1345  Gross per 24 hour   Intake 240 ml   Output --   Net 240 ml       PHYSICAL EXAMINATION      Physical Exam  Visit Vitals  BP (!) 116/59 (BP Location: Left upper arm, Patient Position: Sitting)   Pulse 66   Temp 36.4 °C (97.6 °F) (Temporal)   Resp 16   Ht 1.66 m (5' 5.35\")   Wt 52.6 kg (115 lb 15.4 oz)   SpO2 98%   BMI 19.09 kg/m²       Physical Exam  General Appearance:        Awake, Alert, Oriented x3   Head:    Normocephalic   Eyes:    No icterus   Respiratory:     Clear to auscultation bilaterally   Cardiovascular   Regular rate and rhythm   GI:     Soft, non-tender, bowel sounds active    Psychiatric   Appropriate, cooperative      LINES, CATHETERS, DRAINS, AIRWAYS, AND WOUNDS   Lines, Drains, and Airways:  Wounds (agree with documentation and present on admission):  Surgical Incision Hip Anterior;Left;Proximal (Active)   Number of days: 3       Surgical Incision Hip Left (Active)   Number of days: 3         Comments:      LABS / IMAGING / TELE      Labs  Results from last 7 days   Lab Units 01/31/22  0219   WBC K/uL 7.37   HEMOGLOBIN g/dL 10.9*   HEMATOCRIT % 32.2*   PLATELETS K/uL 239     Results from last 7 days   Lab Units 01/31/22  0219   SODIUM mEQ/L 134*   POTASSIUM mEQ/L 4.3   CHLORIDE mEQ/L 100   CO2 mEQ/L 26   BUN mg/dL 20   CREATININE mg/dL 0.9   GLUCOSE mg/dL 94   CALCIUM mg/dL 8.7*         SARS-CoV-2 (COVID-19) (no units)   Date/Time Value   01/28/2022 1128 Negative "       ASSESSMENT AND PLAN      * Closed left hip fracture, initial encounter (CMS/[facility])  Assessment & Plan  71 yr old female with no significant past medical history presenting with mechanical fall leading to left hip fracture  S/p Left Femur Cephalomedullary Nail on 1/28/22  Continue PT/OT-feels that she is doing well with physical therapy.  Encourage IS  Continue Bowel regimen  DVT prophylaxis and pain meds per ortho  I discussed the diagnosis of osteoporosis with the patient.  She has had DEXA scans in the past.  She had Fosamax many years ago.  She was recently just taking supplements and trying to do weightbearing exercise.  I encouraged her to follow-up with her physicians, she sees an endocrinologist.  She may need further pharmacologic treatment and can discuss the risks/benefits with her outpatient physician.  We discussed management of osteoporosis to help prevent future fragility fractures.    Other acne  Assessment & Plan  Hold aldactone for now-can resume upon discharge if she desires    Leucocytosis  Assessment & Plan  WBC remains improved  Afebrile.  Chest x-ray not suggestive of pneumonia    Bilateral ovarian cysts  Assessment & Plan  Recommend outpatient follow up with gyn    Bilateral carotid bruits  Assessment & Plan  Patient denies any history of stroke or tia  outpatient f/u for carotid ultrasound         Encourage follow-up with PCP, GYN, and endocrinology for her carotid bruits, ovarian cysts, management of osteoporosis     Reggie Chan, DO  1/31/2022

## 2022-01-31 NOTE — PROGRESS NOTES
Met with patient at bedside. Patient will be going to daughter in laws home in [facility]  today. Declines homecare in [place]. Per patient she will be returning to [facility] on Saturday. Requesting Home PT in [place].  Called PCP's office Dr Mariee [phone]. They use [facility]. Called [phone]. Spoke to intake. [facility] faxed Physician referral form. Form filled out, ortho resident signed. Faxed referral form, face sheet, or report, H/P, discharge instructions  to [phone]. Confirmation received. Per intake they will review. Patient aware if no contact from [facility] to call them. Phone contact provided to patient.  Also aware if needed to  call PCP to facilitate home care referral.

## 2022-01-31 NOTE — PLAN OF CARE
Plan of Care Review  Plan of Care Reviewed With: patient  Outcome Summary: Pain controlled with acetaminophen, cleared PT and OT plan discharge home today

## 2022-01-31 NOTE — PROGRESS NOTES
Patient:  Julisa LANDIN Post  Location:  [facility] 5PAV 5406  MRN:  [id number]  Today's date:  1/31/2022    RN cleared pt for therapy prior to session. Pt left in recliner with alarm set, call bell and personal items accessible, all questions answered and all needs addressed.  RN notified of position and performance    Julisa is a 71 y.o. female admitted on 1/28/2022 with Closed left hip fracture, initial encounter (CMS/Formerly [facility]) [S72.002A]. Principal problem is Closed left hip fracture, initial encounter (CMS/Formerly [facility]).    Past Medical History  Julisa has a past medical history of Acne and Osteoporosis.    History of Present Illness  71 y.o. year old female who presented to the hospital after a fall 1/28/22 with an inability to bear weight. The patient was diagnosed with a left intertrochanteric femur fracture.  S/p L femur IMN 1/28/22. WBAT LLE       OT Vitals    Date/Time Pulse BP BP Location BP Method Pt Position Observations [facility]   01/31/22 0910 61 101/54 Left upper arm Automatic Sitting OT NB      OT Pain    Date/Time Pain Type Side/Orientation Location Rating: Rest Rating: Activity Interventions [facility]   01/31/22 0910 Pain Assessment left hip 0 1 position adjusted NB          Prior Living Environment      Most Recent Value   Current Living Arrangements apartment   Living Environment Comment Normally lives alone in apartment in [facility], 0STE and elevator, tub shower. Is in [place] visiting family, can return home with family if absolutely needed, however wishes to get back to IND baseline and return to [facility]        Prior Level of Function      Most Recent Value   Ambulation independent   Transferring independent   Toileting independent   Bathing independent   Dressing independent   Eating independent   Prior Level of Function Comment IND with ADLs, IADLs, functional mobility without AD. Is a , however not often as lives in [place]. +works full time as psychotherapist/SW.   Assistive Device Currently Used at Home none         Occupational Profile      Most Recent Value   Reason for Services/Referral L femur IMN,  ADL intervention/dispo needs   Successful Occupations Works full time as a psychotherapist/SW, Enjoys traveling   Performance Patterns Enjoys exercising daily on [employer]   Environmental Supports and Barriers Supportive family   Patient Goals To go to [facility]           OT Evaluation and Treatment - 01/31/22 0910        OT Time Calculation    Start Time 0910     Stop Time 0958     Time Calculation (min) 48 min        Session Details    Document Type daily treatment/progress note     Mode of Treatment occupational therapy        General Information    Patient Profile Reviewed yes     General Observations of Patient Pt rec'd in recliner, agreeable to therapy     Existing Precautions/Restrictions fall;weight bearing        Weight-bearing Status    Left LE Weight-Bearing Status weight-bearing as tolerated (WBAT)        Cognition/Psychosocial    Affect/Mental Status (Cognition) WFL;anxious     Orientation Status (Cognition) oriented x 3     Follows Commands (Cognition) WFL     Cognitive Function WFL     Comment, Cognition Pleasant and cooperative, thoughtful/insightful with questions regarding DME/discharge plans. Anxious at times during mobility        Bed Mobility    Comment (Bed Mobility) Rec'd OOB        Transfers    Transfers toilet transfer;tub transfer;shower transfer     Maintains Weight-Bearing Status (Transfers) able to maintain        Sit to Stand Transfer    [place], Sit to Stand Transfer modified independence     Assistive Device walker, front-wheeled     Comment Steady, no cues for safety. Denied dizziness        Stand to Sit Transfer    [place], Stand to Sit Transfer modified independence     Assistive Device walker, front-wheeled     Comment Good controlled descent        Toilet Transfer    Transfer Technique sit-stand;stand-sit     [place], Toilet Transfer modified independence     Assistive Device trina  bars/safety frame;walker, front-wheeled     Comment Trialed in room while simulating home setup. Discussed various options for external support based on what is available at family's home and her apartment in [place].        Shower Transfer    Comment Discussion only - educated regarding back-in step technique. Verbalized understanding        Tub Transfer    Comment Attempted tub transfer - patient anxious/afraid to bear weight on LLE in order to step over tub. Reports she will sponge bathe; able to use family's stall shower initially at d/c        Balance    Static Sitting Balance WFL     Dynamic Sitting Balance WFL     Sit to Stand Dynamic Balance WFL;supported     Static Standing Balance WFL;supported     Dynamic Standing Balance WFL;supported     Comment, Balance Mod I with RW, no overt LOB        Motor Skills    Functional Endurance WFL        Bathing    Comment Patient verbalized that she will sponge bathe at d/c        Lower Body Dressing    [place] modified independence     Position supported sitting     Comment Pt provided with education and demonstration on adaptive strategies and equipment to use for LB Dressing. Educated to wear non slip footwear, utilize assistance if available. Provided with demo and education on total LB Dressing including shoes, socks, underwear, pants. Emphasis on energy conservation and safety        Grooming    Self-Performance washes, rinses and dries hands     [place] modified independence     Position sink side        Toileting    [place] modified independence;adjust/manage clothing;perform bladder hygiene     Position supported standing;supported sitting        AM-PAC (TM) - ADL (Current Function)    Putting on and taking off regular lower body clothing? 4 - None     Bathing? 3 - A Little     Toileting? 4 - None     Putting on/taking off regular upper body clothing? 4 - None     How much help for taking care of personal grooming? 4 - None     Eating meals? 4 - None      AM-PAC (TM) ADL Score 23        Assessment/Plan (OT)    Daily Outcome Statement OT session complete. Pt demo mod I with functional transfers and ADLs including toileting, LBD and grooming. Education provided on DME and safety/compensatory techniques for ADLs. Pt verbalized understanding and will have support available at d/c. No further acute OT needs, d/c home with assist/HHOT               OT Assessment/Plan      Most Recent Value   OT Recommended Discharge Disposition home with assistance, home with home health at 01/31/2022 0910   Anticipated Equipment Needs At Discharge (OT) other (see comments)  [TBD pending progression] at 01/29/2022 1043   Patient/Family Therapy Goal Statement To go to [facility] at 01/29/2022 1043                    Education Documentation  No documentation found.        OT Goals      Most Recent Value   Bed Mobility Goal 1    Activity/Assistive Device bed mobility activities, all at 01/29/2022 1043   [place] modified independence at 01/29/2022 1043   Time Frame short-term goal (STG) at 01/29/2022 1043   Progress/Outcome goal met at 01/31/2022 0910   Transfer Goal 1    Activity/Assistive Device all transfers at 01/29/2022 1043   [place] modified independence at 01/29/2022 1043   Time Frame 5-7 days at 01/29/2022 1043   Progress/Outcome goal partially met  [except shower transfer] at 01/31/2022 0910   Dressing Goal 1    Activity/Adaptive Equipment dressing skills, all at 01/29/2022 1043   [place] modified independence at 01/29/2022 1043   Time Frame 5-7 days at 01/29/2022 1043   Progress/Outcome goal met at 01/31/2022 0910   Toileting Goal 1    Activity/Assistive Device toileting skills, all at 01/29/2022 1043   [place] modified independence at 01/29/2022 1043   Time Frame 5-7 days at 01/29/2022 1043   Progress/Outcome goal met at 01/31/2022 0910   Grooming Goal 1    Activity/Assistive Device grooming skills, all at 01/29/2022 1043   [place] modified independence   [standing at sink side for >5min] at 01/29/2022 1043   Time Frame 5-7 days at 01/29/2022 1043   Progress/Outcome goal met at 01/31/2022 0968

## 2022-01-31 NOTE — DISCHARGE INSTRUCTIONS
Please follow-up with Dr. Courtney 4 weeks after surgery. Call the office at [phone] to schedule an appointment.     Bilateral carotid bruits - follow-up with PCP/cardiology to obtain carotid ultrasound  Ovarian cysts - follow-up with your GYN doctor for evaluation   Xixamenmhant-jytmxw-sd with your endocrinologist for further management/bone density scan if not done recently          DVT Prophylaxis:  -Continue with Aspirin 81mg by mouth twice daily X 6 weeks for blood clot prevention.    Constipation/ Pain Med:   - Take your pain medication with food to prevent nausea  - Do not drive while taking pain medications.   - Pain medications may cause constipation.   - Drink plenty of fluids and eat fresh fruits and vegetables.  - Please take Senna-Colace as prescribed. Hold for loose stool. If you are having difficulties having a bowel movement or haven't had bowel movement in 2 days, please add over the counter miralax and take as directed on package.   - If you have not had a bowel movement in three days please call the office.    Incision Care:   - On February 2, you may take off your dressing and leave your incision open to air.   - However, if there is any drainage please keep covered with gauze and tape.  - Please keep your incision(s) clean and dry  - Make sure your incision(s) are checked at least twice daily for signs and symptoms of infection.   If any of the below should occur, please call the office:  - Drainage from incision site  - Opening of incisions  - Fevers greater than 101  - Flu-like symptoms  - Increased redness and/or tenderness  Please call office or go to emergency department if :  - Thigh/calf pain or swelling in legs  - Chest pain  - Chest congestion  - Problems with breathing.

## 2022-02-01 NOTE — DISCHARGE SUMMARY
Inpatient Discharge Summary    The patient is a 71 y.o. healthy female who presented with left intertrochanteric femur fracture after a fall. Dr. Courtney recommended Procedure(s):  OPEN REDUCTION INTERNAL FIXATION HIP/FEMUR FRACTURE TFN--TROCHANTERIC FIXATION NAIL/GAMMA/T2 NAIL .     The patient underwent the procedure on 1/28/2022. Patient tolerated the procedure well. The patient's post-operative course was uncomplicated. The patient was seen by the orthopaedic and hospital medicine service and progressed to the point that on the day of discharge they were voiding without difficulty, tolerating a house diet, and were comfortable with all post-operative activity restrictions.     At discharge the patient's vital signs were stable and the surgical incision site was clean, dry and intact.     The patient was discharged on 1/31/2022 and told to continue pain medications and bowel regimen as needed, resume home medications except for those marked as held on discharge instructions, and to follow up with their surgeon. The patient was advised to call the office with any problems, including drainage from the incision, redness around the incision, worsening pain, fever greater than 101 degrees F, new numbness, weakness, or tingling in the extremities, numbness of the genital or saddle region, or incontinence of bowel or bladder.     Ralph Low MD  Orthopaedic Surgery PGY-1

## 2024-07-14 ENCOUNTER — EMERGENCY (EMERGENCY)
Facility: HOSPITAL | Age: 74
LOS: 1 days | Discharge: HOME | End: 2024-07-14
Attending: EMERGENCY MEDICINE | Admitting: EMERGENCY MEDICINE
Payer: MEDICARE

## 2024-07-14 VITALS
HEART RATE: 67 BPM | RESPIRATION RATE: 18 BRPM | WEIGHT: 110.01 LBS | DIASTOLIC BLOOD PRESSURE: 62 MMHG | SYSTOLIC BLOOD PRESSURE: 119 MMHG | OXYGEN SATURATION: 99 % | HEIGHT: 65 IN | TEMPERATURE: 98 F

## 2024-07-14 VITALS — HEART RATE: 64 BPM

## 2024-07-14 PROCEDURE — 99283 EMERGENCY DEPT VISIT LOW MDM: CPT

## 2024-07-17 DIAGNOSIS — M54.2 CERVICALGIA: ICD-10-CM

## 2024-07-17 DIAGNOSIS — M62.838 OTHER MUSCLE SPASM: ICD-10-CM

## 2024-07-17 DIAGNOSIS — G89.29 OTHER CHRONIC PAIN: ICD-10-CM

## 2024-07-17 DIAGNOSIS — Z88.0 ALLERGY STATUS TO PENICILLIN: ICD-10-CM

## 2024-08-06 NOTE — ED ADULT TRIAGE NOTE - STATUS:
Caller: Carol Preciado    Relationship: Self    Best call back number: [phone]    Requested Prescriptions:   Requested Prescriptions     Pending Prescriptions Disp Refills    amphetamine-dextroamphetamine (Adderall) 20 MG tablet 40 tablet 0     Sig: Take 0.5 tablets by mouth 3 (Three) Times a Day.        Pharmacy where request should be sent: [facility] #[id number] - [facility], located within [facility] [place] [address] [phone] [facility] [phone]      Last office visit with prescribing clinician: 5/29/2024   Last telemedicine visit with prescribing clinician: Visit date not found   Next office visit with prescribing clinician: Visit date not found     Additional details provided by patient: HAS 1 DAY OF MEDICATION     Does the patient have less than a 3 day supply:  [x] Yes  [] No      Rigoberto Hameed Rep   08/06/24 15:18 EDT          [employer]

## (undated) DEVICE — SOLN IRRIG STER WATER 1000ML

## (undated) DEVICE — CAST PADDING 6IN

## (undated) DEVICE — SUTURE VICRYL 2-0  J596H

## (undated) DEVICE — MANIFOLD SINGLE PORT NEPTUNE

## (undated) DEVICE — GOWN SURGICAL REINFORCED X-LAR

## (undated) DEVICE — APPLICATOR CHLORAPREP 26ML ORANGE TINT

## (undated) DEVICE — PAD GROUND ELECTROSURGICAL W/CORD

## (undated) DEVICE — SOLN IRRIG .9%SOD 1000ML

## (undated) DEVICE — GLOVE SZ 7.5 PROTEXIS PI

## (undated) DEVICE — PIN GUIDE

## (undated) DEVICE — ADHESIVE SKIN DERMABOND ADVANCED 0.7ML

## (undated) DEVICE — GLOVE SZ 8 LINER PROTEXIS PI BL

## (undated) DEVICE — COVER LIGHTHANDLE

## (undated) DEVICE — BLANKET UPPER BODY BAIR HUGGER

## (undated) DEVICE — BIT 4.2MM THREE-FLUTED DRILL QC/330MM/100MM

## (undated) DEVICE — PACK RFID HIP PINNING

## (undated) DEVICE — BANDAGE COHESIVE 4IN

## (undated) DEVICE — SUTURE VICRYL 1 J699H OS-8 27IN UNDYED

## (undated) DEVICE — SPONGE LAP 18X18 SAFE-T RFID ENHANCED XRAY

## (undated) DEVICE — DRAPE IOBAN

## (undated) DEVICE — ***USE 57698*** SLEEVE FLOWTRON DVT CALF SINGLE USE

## (undated) DEVICE — DRESSING MEPILEX 4X4 BORDER

## (undated) DEVICE — SUTURE MONOCRYL 3-0  Y497G